# Patient Record
Sex: FEMALE | Race: WHITE | Employment: FULL TIME | URBAN - METROPOLITAN AREA
[De-identification: names, ages, dates, MRNs, and addresses within clinical notes are randomized per-mention and may not be internally consistent; named-entity substitution may affect disease eponyms.]

---

## 2017-03-31 ENCOUNTER — OFFICE VISIT (OUTPATIENT)
Dept: FAMILY MEDICINE CLINIC | Age: 52
End: 2017-03-31

## 2017-03-31 VITALS
BODY MASS INDEX: 33.49 KG/M2 | HEART RATE: 68 BPM | RESPIRATION RATE: 14 BRPM | WEIGHT: 189 LBS | DIASTOLIC BLOOD PRESSURE: 64 MMHG | HEIGHT: 63 IN | SYSTOLIC BLOOD PRESSURE: 112 MMHG

## 2017-03-31 DIAGNOSIS — Z12.11 SCREEN FOR COLON CANCER: ICD-10-CM

## 2017-03-31 DIAGNOSIS — Z00.00 WELL ADULT EXAM: Primary | ICD-10-CM

## 2017-03-31 DIAGNOSIS — L30.9 ECZEMA, UNSPECIFIED TYPE: ICD-10-CM

## 2017-03-31 PROCEDURE — 99386 PREV VISIT NEW AGE 40-64: CPT | Performed by: FAMILY MEDICINE

## 2017-03-31 RX ORDER — OLOPATADINE HCL 0.2 %
1 DROPS OPHTHALMIC (EYE) DAILY
COMMUNITY
Start: 2017-03-27 | End: 2017-04-25 | Stop reason: ALTCHOICE

## 2017-03-31 RX ORDER — PREDNISONE 20 MG/1
TABLET ORAL
COMMUNITY
Start: 2017-03-27 | End: 2017-04-25 | Stop reason: ALTCHOICE

## 2017-03-31 ASSESSMENT — ENCOUNTER SYMPTOMS
CONSTIPATION: 0
SHORTNESS OF BREATH: 0
SINUS PRESSURE: 0

## 2017-04-25 ENCOUNTER — OFFICE VISIT (OUTPATIENT)
Dept: FAMILY MEDICINE CLINIC | Age: 52
End: 2017-04-25

## 2017-04-25 VITALS
SYSTOLIC BLOOD PRESSURE: 118 MMHG | HEART RATE: 64 BPM | HEIGHT: 63 IN | BODY MASS INDEX: 32.8 KG/M2 | WEIGHT: 185.13 LBS | DIASTOLIC BLOOD PRESSURE: 70 MMHG | RESPIRATION RATE: 16 BRPM

## 2017-04-25 DIAGNOSIS — K52.9 ENTERITIS: Primary | ICD-10-CM

## 2017-04-25 PROCEDURE — 99213 OFFICE O/P EST LOW 20 MIN: CPT | Performed by: FAMILY MEDICINE

## 2017-04-25 ASSESSMENT — ENCOUNTER SYMPTOMS
ABDOMINAL PAIN: 1
SINUS PRESSURE: 0
DIARRHEA: 1
NAUSEA: 0
SHORTNESS OF BREATH: 0
CONSTIPATION: 0

## 2017-07-25 ENCOUNTER — OFFICE VISIT (OUTPATIENT)
Dept: INTERNAL MEDICINE CLINIC | Age: 52
End: 2017-07-25
Payer: MEDICARE

## 2017-07-25 VITALS
DIASTOLIC BLOOD PRESSURE: 84 MMHG | HEIGHT: 64 IN | HEART RATE: 72 BPM | WEIGHT: 194 LBS | SYSTOLIC BLOOD PRESSURE: 130 MMHG | BODY MASS INDEX: 33.12 KG/M2

## 2017-07-25 DIAGNOSIS — R12 HEARTBURN: ICD-10-CM

## 2017-07-25 DIAGNOSIS — R07.9 CHEST PAIN, UNSPECIFIED TYPE: Primary | ICD-10-CM

## 2017-07-25 PROCEDURE — 93000 ELECTROCARDIOGRAM COMPLETE: CPT | Performed by: INTERNAL MEDICINE

## 2017-07-25 PROCEDURE — 99214 OFFICE O/P EST MOD 30 MIN: CPT | Performed by: INTERNAL MEDICINE

## 2017-07-25 RX ORDER — IBUPROFEN 200 MG
800 TABLET ORAL
COMMUNITY
End: 2019-06-07

## 2017-07-25 RX ORDER — OMEPRAZOLE 20 MG/1
20 CAPSULE, DELAYED RELEASE ORAL DAILY
Qty: 30 CAPSULE | Refills: 3 | Status: SHIPPED | OUTPATIENT
Start: 2017-07-25 | End: 2017-07-25 | Stop reason: CLARIF

## 2017-07-25 RX ORDER — CALCIUM CARBONATE 200(500)MG
2 TABLET,CHEWABLE ORAL 3 TIMES DAILY
COMMUNITY
End: 2019-06-07

## 2017-07-25 RX ORDER — OMEPRAZOLE 20 MG/1
20 CAPSULE, DELAYED RELEASE ORAL DAILY
Qty: 30 CAPSULE | Refills: 3 | Status: SHIPPED | OUTPATIENT
Start: 2017-07-25 | End: 2019-06-07

## 2017-07-27 ENCOUNTER — OFFICE VISIT (OUTPATIENT)
Dept: FAMILY MEDICINE CLINIC | Age: 52
End: 2017-07-27

## 2017-07-27 ENCOUNTER — NURSE TRIAGE (OUTPATIENT)
Dept: ADMINISTRATIVE | Age: 52
End: 2017-07-27

## 2017-07-27 ENCOUNTER — HOSPITAL ENCOUNTER (OUTPATIENT)
Age: 52
Discharge: HOME | End: 2017-07-27
Payer: MEDICARE

## 2017-07-27 VITALS
SYSTOLIC BLOOD PRESSURE: 118 MMHG | HEART RATE: 64 BPM | BODY MASS INDEX: 32.84 KG/M2 | WEIGHT: 192.38 LBS | DIASTOLIC BLOOD PRESSURE: 56 MMHG | RESPIRATION RATE: 16 BRPM | HEIGHT: 64 IN

## 2017-07-27 DIAGNOSIS — R10.10 PAIN OF UPPER ABDOMEN: ICD-10-CM

## 2017-07-27 DIAGNOSIS — R10.10 PAIN OF UPPER ABDOMEN: Primary | ICD-10-CM

## 2017-07-27 LAB
ALBUMIN SERPL-MCNC: 4 G/DL (ref 3.5–5.1)
ALP BLD-CCNC: 73 U/L (ref 38–126)
ALT SERPL-CCNC: 11 U/L (ref 11–66)
ANION GAP SERPL CALCULATED.3IONS-SCNC: 14 MEQ/L (ref 8–16)
AST SERPL-CCNC: 9 U/L (ref 5–40)
BASOPHILS # BLD: 1 %
BASOPHILS ABSOLUTE: 0.1 THOU/MM3 (ref 0–0.1)
BILIRUB SERPL-MCNC: 0.3 MG/DL (ref 0.3–1.2)
BUN BLDV-MCNC: 16 MG/DL (ref 7–22)
CALCIUM SERPL-MCNC: 9.3 MG/DL (ref 8.5–10.5)
CHLORIDE BLD-SCNC: 103 MEQ/L (ref 98–111)
CO2: 25 MEQ/L (ref 23–33)
CREAT SERPL-MCNC: 0.6 MG/DL (ref 0.4–1.2)
EOSINOPHIL # BLD: 3.3 %
EOSINOPHILS ABSOLUTE: 0.2 THOU/MM3 (ref 0–0.4)
GFR SERPL CREATININE-BSD FRML MDRD: > 90 ML/MIN/1.73M2
GLUCOSE BLD-MCNC: 95 MG/DL (ref 70–108)
HCT VFR BLD CALC: 44.8 % (ref 37–47)
HEMOGLOBIN: 15.3 GM/DL (ref 12–16)
LIPASE: 20.6 U/L (ref 5.6–51.3)
LYMPHOCYTES # BLD: 46.5 %
LYMPHOCYTES ABSOLUTE: 2.9 THOU/MM3 (ref 1–4.8)
MCH RBC QN AUTO: 32 PG (ref 27–31)
MCHC RBC AUTO-ENTMCNC: 34.2 GM/DL (ref 33–37)
MCV RBC AUTO: 93.8 FL (ref 81–99)
MONOCYTES # BLD: 7.2 %
MONOCYTES ABSOLUTE: 0.4 THOU/MM3 (ref 0.4–1.3)
NUCLEATED RED BLOOD CELLS: 0 /100 WBC
PDW BLD-RTO: 12.6 % (ref 11.5–14.5)
PLATELET # BLD: 279 THOU/MM3 (ref 130–400)
PMV BLD AUTO: 8 MCM (ref 7.4–10.4)
POTASSIUM SERPL-SCNC: 4.2 MEQ/L (ref 3.5–5.2)
RBC # BLD: 4.78 MILL/MM3 (ref 4.2–5.4)
RBC # BLD: NORMAL 10*6/UL
SEG NEUTROPHILS: 42 %
SEGMENTED NEUTROPHILS ABSOLUTE COUNT: 2.6 THOU/MM3 (ref 1.8–7.7)
SODIUM BLD-SCNC: 142 MEQ/L (ref 135–145)
TOTAL PROTEIN: 7.1 G/DL (ref 6.1–8)
WBC # BLD: 6.2 THOU/MM3 (ref 4.8–10.8)

## 2017-07-27 PROCEDURE — 99214 OFFICE O/P EST MOD 30 MIN: CPT | Performed by: FAMILY MEDICINE

## 2017-07-27 PROCEDURE — 80053 COMPREHEN METABOLIC PANEL: CPT

## 2017-07-27 PROCEDURE — 85025 COMPLETE CBC W/AUTO DIFF WBC: CPT

## 2017-07-27 PROCEDURE — 36415 COLL VENOUS BLD VENIPUNCTURE: CPT

## 2017-07-27 PROCEDURE — 83690 ASSAY OF LIPASE: CPT

## 2017-07-27 ASSESSMENT — ENCOUNTER SYMPTOMS
NAUSEA: 1
ABDOMINAL PAIN: 1
SHORTNESS OF BREATH: 1
CONSTIPATION: 1
SINUS PRESSURE: 0

## 2017-07-28 ENCOUNTER — HOSPITAL ENCOUNTER (OUTPATIENT)
Dept: ULTRASOUND IMAGING | Age: 52
Discharge: HOME | End: 2017-07-28
Payer: MEDICARE

## 2017-07-28 ENCOUNTER — TELEPHONE (OUTPATIENT)
Dept: FAMILY MEDICINE CLINIC | Age: 52
End: 2017-07-28

## 2017-07-28 DIAGNOSIS — R10.33 PERIUMBILICAL ABDOMINAL PAIN: Primary | ICD-10-CM

## 2017-07-28 DIAGNOSIS — R10.10 PAIN OF UPPER ABDOMEN: ICD-10-CM

## 2017-07-28 PROCEDURE — 76705 ECHO EXAM OF ABDOMEN: CPT

## 2017-07-28 RX ORDER — DICYCLOMINE HYDROCHLORIDE 10 MG/1
10 CAPSULE ORAL 4 TIMES DAILY
Qty: 30 CAPSULE | Refills: 1 | Status: SHIPPED | OUTPATIENT
Start: 2017-07-28 | End: 2019-06-07

## 2017-07-31 ENCOUNTER — HOSPITAL ENCOUNTER (OUTPATIENT)
Dept: NUCLEAR MEDICINE | Age: 52
Discharge: HOME | End: 2017-07-31
Payer: MEDICARE

## 2017-07-31 ENCOUNTER — TELEPHONE (OUTPATIENT)
Dept: FAMILY MEDICINE CLINIC | Age: 52
End: 2017-07-31

## 2017-07-31 VITALS — WEIGHT: 192 LBS | BODY MASS INDEX: 32.96 KG/M2

## 2017-07-31 DIAGNOSIS — R10.10 PAIN OF UPPER ABDOMEN: ICD-10-CM

## 2017-07-31 PROCEDURE — 3430000000 HC RX DIAGNOSTIC RADIOPHARMACEUTICAL: Performed by: FAMILY MEDICINE

## 2017-07-31 PROCEDURE — A6257 TRANSPARENT FILM <= 16 SQ IN: HCPCS

## 2017-07-31 PROCEDURE — 6360000004 HC RX CONTRAST MEDICATION: Performed by: RADIOLOGY

## 2017-07-31 PROCEDURE — A9537 TC99M MEBROFENIN: HCPCS | Performed by: FAMILY MEDICINE

## 2017-07-31 PROCEDURE — 78227 HEPATOBIL SYST IMAGE W/DRUG: CPT

## 2017-07-31 RX ADMIN — SINCALIDE 1.73 MCG: 5 INJECTION, POWDER, LYOPHILIZED, FOR SOLUTION INTRAVENOUS at 08:40

## 2017-07-31 RX ADMIN — Medication 7.9 MILLICURIE: at 07:30

## 2017-08-02 ENCOUNTER — TELEPHONE (OUTPATIENT)
Dept: FAMILY MEDICINE CLINIC | Age: 52
End: 2017-08-02

## 2017-08-04 ENCOUNTER — HOSPITAL ENCOUNTER (OUTPATIENT)
Dept: CT IMAGING | Age: 52
Discharge: HOME | End: 2017-08-04
Payer: MEDICARE

## 2017-08-04 ENCOUNTER — TELEPHONE (OUTPATIENT)
Dept: FAMILY MEDICINE CLINIC | Age: 52
End: 2017-08-04

## 2017-08-04 DIAGNOSIS — R10.33 PERIUMBILICAL ABDOMINAL PAIN: ICD-10-CM

## 2017-08-04 PROCEDURE — 6360000004 HC RX CONTRAST MEDICATION: Performed by: FAMILY MEDICINE

## 2017-08-04 PROCEDURE — 74177 CT ABD & PELVIS W/CONTRAST: CPT

## 2017-08-04 RX ADMIN — IOPAMIDOL 85 ML: 755 INJECTION, SOLUTION INTRAVENOUS at 07:44

## 2017-08-28 ENCOUNTER — HOSPITAL ENCOUNTER (OUTPATIENT)
Dept: WOMENS IMAGING | Age: 52
Discharge: HOME | End: 2017-08-28
Payer: COMMERCIAL

## 2017-08-28 DIAGNOSIS — Z12.31 VISIT FOR SCREENING MAMMOGRAM: ICD-10-CM

## 2017-08-28 PROCEDURE — 77063 BREAST TOMOSYNTHESIS BI: CPT

## 2017-10-02 PROBLEM — D12.6 ADENOMATOUS COLON POLYP: Status: ACTIVE | Noted: 2017-09-01

## 2019-03-21 ENCOUNTER — HOSPITAL ENCOUNTER (OUTPATIENT)
Dept: WOMENS IMAGING | Age: 54
Discharge: HOME | End: 2019-03-21
Payer: COMMERCIAL

## 2019-03-21 DIAGNOSIS — Z12.31 VISIT FOR SCREENING MAMMOGRAM: ICD-10-CM

## 2019-03-21 PROCEDURE — 77063 BREAST TOMOSYNTHESIS BI: CPT

## 2019-06-07 ENCOUNTER — OFFICE VISIT (OUTPATIENT)
Dept: FAMILY MEDICINE CLINIC | Age: 54
End: 2019-06-07

## 2019-06-07 VITALS
RESPIRATION RATE: 14 BRPM | BODY MASS INDEX: 29.79 KG/M2 | HEIGHT: 64 IN | WEIGHT: 174.5 LBS | HEART RATE: 68 BPM | SYSTOLIC BLOOD PRESSURE: 106 MMHG | DIASTOLIC BLOOD PRESSURE: 64 MMHG

## 2019-06-07 DIAGNOSIS — R07.89 ATYPICAL CHEST PAIN: ICD-10-CM

## 2019-06-07 DIAGNOSIS — Z00.00 WELL ADULT EXAM: Primary | ICD-10-CM

## 2019-06-07 PROCEDURE — 99396 PREV VISIT EST AGE 40-64: CPT | Performed by: FAMILY MEDICINE

## 2019-06-07 PROCEDURE — 93000 ELECTROCARDIOGRAM COMPLETE: CPT | Performed by: FAMILY MEDICINE

## 2019-06-07 RX ORDER — ASPIRIN 81 MG/1
81 TABLET ORAL DAILY
COMMUNITY
Start: 2019-06-07 | End: 2019-07-03 | Stop reason: ALTCHOICE

## 2019-06-07 RX ORDER — LACTOBACILLUS RHAMNOSUS GG 10B CELL
1 CAPSULE ORAL DAILY
COMMUNITY
End: 2022-08-30 | Stop reason: ALTCHOICE

## 2019-06-07 ASSESSMENT — PATIENT HEALTH QUESTIONNAIRE - PHQ9
SUM OF ALL RESPONSES TO PHQ QUESTIONS 1-9: 0
SUM OF ALL RESPONSES TO PHQ QUESTIONS 1-9: 0
2. FEELING DOWN, DEPRESSED OR HOPELESS: 0
1. LITTLE INTEREST OR PLEASURE IN DOING THINGS: 0
SUM OF ALL RESPONSES TO PHQ9 QUESTIONS 1 & 2: 0

## 2019-06-07 ASSESSMENT — ENCOUNTER SYMPTOMS
SINUS PRESSURE: 0
CONSTIPATION: 0
SHORTNESS OF BREATH: 0

## 2019-06-07 NOTE — PATIENT INSTRUCTIONS
Call Dr Josiah Reddy office to arrange a cardiac evaluation  Begin a baby aspirin one daily  Do the fasting labs soon  See me in a year

## 2019-06-07 NOTE — PROGRESS NOTES
Subjective:      Patient ID: Dev Chavez is a 49 y.o. female. HPI  Well Adult Physical   Patient here for a comprehensive physical exam.The patient reports problems - there has been a pinching feeling in the chest the past year. It has gotten worse with the feeling more intense. It could last 4-5 minutes. There are no other associated cardiac symptoms at that time. It could come and go over the day. Non smoker. She has seen Dr Jaswinder Brown in the past for a cardiac eval. Mom with MI age 61 (smoker)  Do you take any herbs or supplements that were not prescribed by a doctor? yes Are you taking calcium supplements? no Are you taking aspirin daily? no   History:  She sees a GYN    Review of Systems   Constitutional: Negative for fatigue. HENT: Negative for sinus pressure. Eyes: Negative for visual disturbance. Respiratory: Negative for shortness of breath. Cardiovascular: Negative for chest pain. Gastrointestinal: Negative for constipation. Genitourinary: Negative. Musculoskeletal: Negative for arthralgias. Skin: Negative for rash. Neurological: Negative for headaches. The patient's medications, allergies, past medical problems, surgical, social, and family histories were reviewed and updated as needed. Objective:   Physical Exam   Constitutional: She is oriented to person, place, and time. She appears well-developed and well-nourished. No distress. HENT:   Head: Normocephalic and atraumatic. Right Ear: External ear normal.   Left Ear: External ear normal.   Nose: Nose normal.   Mouth/Throat: Oropharynx is clear and moist. No oropharyngeal exudate. Eyes: Conjunctivae are normal. No scleral icterus. Neck: Neck supple. Carotid bruit is not present. No tracheal deviation present. No thyromegaly present. Cardiovascular: Normal rate, regular rhythm, normal heart sounds and intact distal pulses. Pulmonary/Chest: Effort normal and breath sounds normal.   Abdominal: Soft.  Bowel sounds are normal. She exhibits no mass. Musculoskeletal: She exhibits no edema. Lymphadenopathy:     She has no cervical adenopathy. Neurological: She is alert and oriented to person, place, and time. Skin: Skin is warm and dry. Psychiatric: She has a normal mood and affect. Her behavior is normal.   Blood pressure 106/64, pulse 68, resp. rate 14, height 5' 4\" (1.626 m), weight 174 lb 8 oz (79.2 kg), not currently breastfeeding. The EKG is reviewed by me with comparison to 7/25/2017 and shows NSR and no ischemic change, the incomplete RBBB continuesw    Assessment:       Diagnosis Orders   1. Well adult exam  Glucose    Lipid, Fasting   2.  Atypical chest pain  EKG 12 Lead           Plan:      Call Dr Nirmal Ewing office to arrange a cardiac evaluation  Begin a baby aspirin one daily  Do the fasting labs soon  See me in a year        Toni Horne MD

## 2019-06-18 ENCOUNTER — HOSPITAL ENCOUNTER (OUTPATIENT)
Age: 54
Discharge: HOME | End: 2019-06-18
Payer: COMMERCIAL

## 2019-06-18 ENCOUNTER — OFFICE VISIT (OUTPATIENT)
Dept: INTERNAL MEDICINE CLINIC | Age: 54
End: 2019-06-18
Payer: COMMERCIAL

## 2019-06-18 VITALS
HEART RATE: 72 BPM | BODY MASS INDEX: 29.71 KG/M2 | WEIGHT: 174 LBS | SYSTOLIC BLOOD PRESSURE: 130 MMHG | DIASTOLIC BLOOD PRESSURE: 72 MMHG | HEIGHT: 64 IN

## 2019-06-18 DIAGNOSIS — R07.89 OTHER CHEST PAIN: Primary | ICD-10-CM

## 2019-06-18 DIAGNOSIS — Z00.00 WELL ADULT EXAM: ICD-10-CM

## 2019-06-18 LAB
CHOLESTEROL, FASTING: 227 MG/DL (ref 100–199)
GLUCOSE BLD-MCNC: 91 MG/DL (ref 70–108)
HDLC SERPL-MCNC: 85 MG/DL
LDL CHOLESTEROL CALCULATED: 123 MG/DL
TRIGLYCERIDE, FASTING: 95 MG/DL (ref 0–199)

## 2019-06-18 PROCEDURE — 99214 OFFICE O/P EST MOD 30 MIN: CPT | Performed by: INTERNAL MEDICINE

## 2019-06-18 PROCEDURE — 80061 LIPID PANEL: CPT

## 2019-06-18 PROCEDURE — 82947 ASSAY GLUCOSE BLOOD QUANT: CPT

## 2019-06-18 PROCEDURE — 36415 COLL VENOUS BLD VENIPUNCTURE: CPT

## 2019-06-18 RX ORDER — MV-MIN/FOLIC/VIT K/LUT/HERB293 240-120MCG
TABLET ORAL DAILY
COMMUNITY
End: 2022-10-03

## 2019-06-18 NOTE — PROGRESS NOTES
organomegaly  Neurological - alert, oriented, normal speech, no focal findings or movement disorder noted  Musculoskeletal - no joint tenderness, deformity or swelling  Extremities - peripheral pulses normal, no pedal edema, no clubbing or cyanosis  Skin - normal coloration and turgor, no rashes, no suspicious skin lesions noted                   Diagnosis Orders   1.  Other chest pain  NM MYOCARDIAL SPECT REST EXERCISE OR RX     Chest pain atypical but needs further work up  Will order exercise cardiolite stress test  EKG ordered by Dr. Jacy Tim shows sinus rhythm, low voltage  I discussed the situation at length with patient and   answered all their questions  I will follow-up on her stress test

## 2019-06-19 ENCOUNTER — TELEPHONE (OUTPATIENT)
Dept: FAMILY MEDICINE CLINIC | Age: 54
End: 2019-06-19

## 2019-06-19 NOTE — TELEPHONE ENCOUNTER
----- Message from Gino Chavez MD sent at 6/18/2019 11:16 PM EDT -----  Let her know the glucose was fine but the cholesterol could be better. Keep working at the diet and weight loss and I would check the lipids again in a year.

## 2019-06-20 ENCOUNTER — TELEPHONE (OUTPATIENT)
Dept: FAMILY MEDICINE CLINIC | Age: 54
End: 2019-06-20

## 2019-06-20 RX ORDER — SULFACETAMIDE SODIUM 100 MG/ML
2 SOLUTION/ DROPS OPHTHALMIC
Qty: 1 BOTTLE | Refills: 0 | Status: SHIPPED | OUTPATIENT
Start: 2019-06-20 | End: 2019-06-30

## 2019-06-20 NOTE — TELEPHONE ENCOUNTER
Pt called stating that her left eye has a bump with yellow pus on the inside of lower eye lid. Her whole eye is red. She stated that it just feels like something is in. Not painful just irritated. She is asking if something would be able to be called in.      [facility] in [place] before [address] in [place] after [address] may be reached at [phone]

## 2019-07-02 ENCOUNTER — TELEPHONE (OUTPATIENT)
Dept: FAMILY MEDICINE CLINIC | Age: 54
End: 2019-07-02

## 2019-07-03 ENCOUNTER — TELEPHONE (OUTPATIENT)
Dept: FAMILY MEDICINE CLINIC | Age: 54
End: 2019-07-03

## 2019-07-11 ENCOUNTER — TELEPHONE (OUTPATIENT)
Dept: INTERNAL MEDICINE CLINIC | Age: 54
End: 2019-07-11

## 2019-08-09 ENCOUNTER — TELEPHONE (OUTPATIENT)
Dept: FAMILY MEDICINE CLINIC | Age: 54
End: 2019-08-09

## 2019-08-09 RX ORDER — DICYCLOMINE HYDROCHLORIDE 10 MG/1
10 CAPSULE ORAL 4 TIMES DAILY
Qty: 30 CAPSULE | Refills: 1 | Status: SHIPPED | OUTPATIENT
Start: 2019-08-09 | End: 2022-08-30 | Stop reason: ALTCHOICE

## 2019-08-09 NOTE — TELEPHONE ENCOUNTER
Pt states that her IBS is acting up and would like for you to prescribe something. States you had given her something before and would like that again.        [facility] on [address]

## 2020-07-21 ENCOUNTER — TELEPHONE (OUTPATIENT)
Dept: FAMILY MEDICINE CLINIC | Age: 55
End: 2020-07-21

## 2020-07-21 NOTE — TELEPHONE ENCOUNTER
Patient called stating that she has been exposed to a co worker on [address] [place] [address] last week. She does not have any symptoms but her employer [employer] in [place] is advising her to get tested.   Please call her back [phone]

## 2020-07-21 NOTE — TELEPHONE ENCOUNTER
I can order the swab for her to do at one of the [address] [place] [address] [facility] but in the mean time she should quarantine herself till the results are back. Will she need a note?

## 2020-09-17 ENCOUNTER — HOSPITAL ENCOUNTER (OUTPATIENT)
Dept: WOMENS IMAGING | Age: 55
Discharge: HOME | End: 2020-09-17
Payer: COMMERCIAL

## 2020-09-17 PROCEDURE — 77063 BREAST TOMOSYNTHESIS BI: CPT

## 2020-12-10 ENCOUNTER — VIRTUAL VISIT (OUTPATIENT)
Dept: FAMILY MEDICINE CLINIC | Age: 55
End: 2020-12-10

## 2020-12-10 ENCOUNTER — HOSPITAL ENCOUNTER (OUTPATIENT)
Age: 55
Discharge: HOME | End: 2020-12-10
Payer: COMMERCIAL

## 2020-12-10 DIAGNOSIS — Z20.822 SUSPECTED COVID-19 VIRUS INFECTION: ICD-10-CM

## 2020-12-10 PROCEDURE — 99213 OFFICE O/P EST LOW 20 MIN: CPT | Performed by: FAMILY MEDICINE

## 2020-12-10 PROCEDURE — U0003 INFECTIOUS AGENT DETECTION BY NUCLEIC ACID (DNA OR RNA); SEVERE ACUTE RESPIRATORY SYNDROME CORONAVIRUS 2 (SARS-COV-2) (CORONAVIRUS DISEASE [COVID-19]), AMPLIFIED PROBE TECHNIQUE, MAKING USE OF HIGH THROUGHPUT TECHNOLOGIES AS DESCRIBED BY CMS-2020-01-R: HCPCS

## 2020-12-10 PROCEDURE — 99211 OFF/OP EST MAY X REQ PHY/QHP: CPT

## 2020-12-10 ASSESSMENT — ENCOUNTER SYMPTOMS
DIARRHEA: 0
BACK PAIN: 1
SHORTNESS OF BREATH: 0
COUGH: 1
SORE THROAT: 1
SINUS PRESSURE: 0
NAUSEA: 0

## 2020-12-10 ASSESSMENT — PATIENT HEALTH QUESTIONNAIRE - PHQ9
SUM OF ALL RESPONSES TO PHQ QUESTIONS 1-9: 0
SUM OF ALL RESPONSES TO PHQ9 QUESTIONS 1 & 2: 0
SUM OF ALL RESPONSES TO PHQ QUESTIONS 1-9: 0
2. FEELING DOWN, DEPRESSED OR HOPELESS: 0
SUM OF ALL RESPONSES TO PHQ QUESTIONS 1-9: 0
1. LITTLE INTEREST OR PLEASURE IN DOING THINGS: 0

## 2020-12-10 NOTE — PROGRESS NOTES
rate-    Temperature-  Pulse oximetry-     Constitutional: [x] Appears well-developed and well-nourished [] No apparent distress      [] Abnormal-   Mental status  [x] Alert and awake  [] Oriented to person/place/time []Able to follow commands      Eyes:  EOM    []  Normal  [] Abnormal-  Sclera  [x]  Normal  [] Abnormal -         Discharge []  None visible  [] Abnormal -    HENT:   [x] Normocephalic, atraumatic. [] Abnormal   [] Mouth/Throat: Mucous membranes are moist.     External Ears [x] Normal  [] Abnormal-     Neck: [x] No visualized mass     Pulmonary/Chest: [x] Respiratory effort normal.  [] No visualized signs of difficulty breathing or respiratory distress        [] Abnormal-      Musculoskeletal:   [] Normal gait with no signs of ataxia         [x] Normal range of motion of neck        [] Abnormal-       Neurological:        [x] No Facial Asymmetry (Cranial nerve 7 motor function) (limited exam to video visit)          [] No gaze palsy        [] Abnormal-         Skin:        [x] No significant exanthematous lesions or discoloration noted on facial skin         [] Abnormal-            Psychiatric:       [x] Normal Affect [] No Hallucinations        [] Abnormal-     Other pertinent observable physical exam findings-     ASSESSMENT/PLAN:   Diagnosis Orders   1. Suspected COVID-19 virus infection  COVID-19 Ambulatory       Laura Chavez is a 48 y.o. female being evaluated by a Virtual Visit (video visit) encounter to address concerns as mentioned above. A caregiver was present when appropriate. Due to this being a TeleHealth encounter (During [facility] public health emergency), evaluation of the following organ systems was limited: Vitals/Constitutional/EENT/Resp/CV/GI//MS/Neuro/Skin/Heme-Lymph-Imm.   Pursuant to the emergency declaration under the [facility], [address] [place] [address] authority and the [employer] Act, this Virtual Visit was conducted with patient's (and/or legal guardian's) consent, to reduce the patient's risk of exposure to COVID-19 and provide necessary medical care. The patient (and/or legal guardian) has also been advised to contact this office for worsening conditions or problems, and seek emergency medical treatment and/or call 911 if deemed necessary. Patient identification was verified at the start of the visit: Yes    Total time spent on this encounter: 15 minutes    Services were provided through a video synchronous discussion virtually to substitute for in-person clinic visit. --Ignacia Villanueva MD on 12/10/2020 at 2:30 PM    An electronic signature was used to authenticate this note.

## 2020-12-10 NOTE — PROGRESS NOTES
Brionna Herman agreed to Video Chat in presence of Dr Anant Cornejo and myself. Verified who was present in room with Brionna Herman. Patient informed the e-mail address used to Face Time cannot be used to contact the Provider, if they are any questions or concerns they need to call the office directly. Brionna Herman stated understanding.

## 2020-12-12 LAB — SARS-COV-2: DETECTED

## 2020-12-21 ENCOUNTER — TELEPHONE (OUTPATIENT)
Dept: FAMILY MEDICINE CLINIC | Age: 55
End: 2020-12-21

## 2020-12-21 RX ORDER — GUAIFENESIN AND CODEINE PHOSPHATE 100; 10 MG/5ML; MG/5ML
10 SOLUTION ORAL 4 TIMES DAILY PRN
Qty: 180 ML | Refills: 0 | Status: SHIPPED | OUTPATIENT
Start: 2020-12-21 | End: 2020-12-28

## 2020-12-21 RX ORDER — AZITHROMYCIN 250 MG/1
TABLET, FILM COATED ORAL
Qty: 1 PACKET | Refills: 0 | Status: SHIPPED | OUTPATIENT
Start: 2020-12-21 | End: 2020-12-31

## 2020-12-21 NOTE — TELEPHONE ENCOUNTER
Pt called stating that today is day 14 of her having covid. She stated that she is still dry coughing, chest burning, light headness, and fatigue. She is asking if there is anything else that she would be able to do to help her feel better to go back to work. Pt stated that prednisone was called in for her on Saturday and she did start it that day.      [facility] in [place] may be reached at [phone]

## 2020-12-21 NOTE — TELEPHONE ENCOUNTER
Patient called and stated the cough medicine called in over the weekend (Sent to [facility] on [address]) the pharmacy can not get it in- it was supposed to be in today however it did not. She would like it resent to [facility] on [address] since it can not be transferred due to Codeine in it.

## 2020-12-21 NOTE — TELEPHONE ENCOUNTER
Patient is on vitamin c, zinc, vitamin d. Pharmacy didn't have cough syrup. It should be at pharmacy today.

## 2022-01-13 ENCOUNTER — HOSPITAL ENCOUNTER (OUTPATIENT)
Dept: WOMENS IMAGING | Age: 57
Discharge: HOME | End: 2022-01-13
Payer: COMMERCIAL

## 2022-01-13 DIAGNOSIS — Z12.31 VISIT FOR SCREENING MAMMOGRAM: ICD-10-CM

## 2022-01-13 PROCEDURE — 77063 BREAST TOMOSYNTHESIS BI: CPT

## 2022-02-24 ENCOUNTER — HOSPITAL ENCOUNTER (EMERGENCY)
Age: 57
Discharge: HOME | End: 2022-02-24
Attending: EMERGENCY MEDICINE
Payer: COMMERCIAL

## 2022-02-24 ENCOUNTER — APPOINTMENT (OUTPATIENT)
Dept: GENERAL RADIOLOGY | Age: 57
End: 2022-02-24
Payer: COMMERCIAL

## 2022-02-24 VITALS
TEMPERATURE: 97.9 F | HEIGHT: 64 IN | SYSTOLIC BLOOD PRESSURE: 108 MMHG | HEART RATE: 68 BPM | DIASTOLIC BLOOD PRESSURE: 59 MMHG | OXYGEN SATURATION: 99 % | RESPIRATION RATE: 16 BRPM | BODY MASS INDEX: 33.29 KG/M2 | WEIGHT: 195 LBS

## 2022-02-24 DIAGNOSIS — R07.9 CHEST PAIN, UNSPECIFIED TYPE: Primary | ICD-10-CM

## 2022-02-24 DIAGNOSIS — S39.012A BACK STRAIN, INITIAL ENCOUNTER: ICD-10-CM

## 2022-02-24 LAB
ANION GAP SERPL CALCULATED.3IONS-SCNC: 10 MEQ/L (ref 8–16)
BASOPHILS # BLD: 0.7 %
BASOPHILS ABSOLUTE: 0.1 THOU/MM3 (ref 0–0.1)
BUN BLDV-MCNC: 16 MG/DL (ref 7–22)
CALCIUM SERPL-MCNC: 9.4 MG/DL (ref 8.5–10.5)
CHLORIDE BLD-SCNC: 106 MEQ/L (ref 98–111)
CO2: 23 MEQ/L (ref 23–33)
CREAT SERPL-MCNC: 0.6 MG/DL (ref 0.4–1.2)
D-DIMER QUANTITATIVE: < 215 NG/ML FEU (ref 0–500)
EOSINOPHIL # BLD: 3.3 %
EOSINOPHILS ABSOLUTE: 0.3 THOU/MM3 (ref 0–0.4)
ERYTHROCYTE [DISTWIDTH] IN BLOOD BY AUTOMATED COUNT: 11.9 % (ref 11.5–14.5)
ERYTHROCYTE [DISTWIDTH] IN BLOOD BY AUTOMATED COUNT: 41.4 FL (ref 35–45)
GFR SERPL CREATININE-BSD FRML MDRD: > 90 ML/MIN/1.73M2
GLUCOSE BLD-MCNC: 98 MG/DL (ref 70–108)
HCT VFR BLD CALC: 42.1 % (ref 37–47)
HEMOGLOBIN: 14.2 GM/DL (ref 12–16)
IMMATURE GRANS (ABS): 0.02 THOU/MM3 (ref 0–0.07)
IMMATURE GRANULOCYTES: 0.2 %
LYMPHOCYTES # BLD: 37.9 %
LYMPHOCYTES ABSOLUTE: 3.2 THOU/MM3 (ref 1–4.8)
MCH RBC QN AUTO: 32.3 PG (ref 26–33)
MCHC RBC AUTO-ENTMCNC: 33.7 GM/DL (ref 32.2–35.5)
MCV RBC AUTO: 95.7 FL (ref 81–99)
MONOCYTES # BLD: 8 %
MONOCYTES ABSOLUTE: 0.7 THOU/MM3 (ref 0.4–1.3)
NUCLEATED RED BLOOD CELLS: 0 /100 WBC
OSMOLALITY CALCULATION: 278.7 MOSMOL/KG (ref 275–300)
PLATELET # BLD: 270 THOU/MM3 (ref 130–400)
PMV BLD AUTO: 9 FL (ref 9.4–12.4)
POTASSIUM REFLEX MAGNESIUM: 4 MEQ/L (ref 3.5–5.2)
RBC # BLD: 4.4 MILL/MM3 (ref 4.2–5.4)
SEG NEUTROPHILS: 49.9 %
SEGMENTED NEUTROPHILS ABSOLUTE COUNT: 4.2 THOU/MM3 (ref 1.8–7.7)
SODIUM BLD-SCNC: 139 MEQ/L (ref 135–145)
TROPONIN T: < 0.01 NG/ML
TROPONIN T: < 0.01 NG/ML
WBC # BLD: 8.4 THOU/MM3 (ref 4.8–10.8)

## 2022-02-24 PROCEDURE — 85379 FIBRIN DEGRADATION QUANT: CPT

## 2022-02-24 PROCEDURE — 85025 COMPLETE CBC W/AUTO DIFF WBC: CPT

## 2022-02-24 PROCEDURE — 99215 OFFICE O/P EST HI 40 MIN: CPT

## 2022-02-24 PROCEDURE — 71045 X-RAY EXAM CHEST 1 VIEW: CPT

## 2022-02-24 PROCEDURE — 99281 EMR DPT VST MAYX REQ PHY/QHP: CPT

## 2022-02-24 PROCEDURE — 99214 OFFICE O/P EST MOD 30 MIN: CPT | Performed by: NURSE PRACTITIONER

## 2022-02-24 PROCEDURE — 36415 COLL VENOUS BLD VENIPUNCTURE: CPT

## 2022-02-24 PROCEDURE — 93005 ELECTROCARDIOGRAM TRACING: CPT | Performed by: NURSE PRACTITIONER

## 2022-02-24 PROCEDURE — 84484 ASSAY OF TROPONIN QUANT: CPT

## 2022-02-24 PROCEDURE — 80048 BASIC METABOLIC PNL TOTAL CA: CPT

## 2022-02-24 PROCEDURE — 93005 ELECTROCARDIOGRAM TRACING: CPT | Performed by: STUDENT IN AN ORGANIZED HEALTH CARE EDUCATION/TRAINING PROGRAM

## 2022-02-24 ASSESSMENT — ENCOUNTER SYMPTOMS
SORE THROAT: 0
CHEST TIGHTNESS: 1
ABDOMINAL PAIN: 0
DIARRHEA: 0
SHORTNESS OF BREATH: 0
BACK PAIN: 1
CONSTIPATION: 0
VOMITING: 0
EYE PAIN: 0
RHINORRHEA: 0
EYE DISCHARGE: 0
COUGH: 0
TROUBLE SWALLOWING: 0
EYE REDNESS: 0
SINUS PAIN: 0
NAUSEA: 0

## 2022-02-24 ASSESSMENT — HEART SCORE: ECG: 0

## 2022-02-24 NOTE — ED NOTES
Patient to ED from [address] [place] [address] c/c chest pain. Patient currently states chest pain is completely gone, but she continues to have tingling in the right arm. Patient reports pressure behind the right breast. VSS.      Kadni Wilcox RN  02/24/22 7100

## 2022-02-24 NOTE — ED PROVIDER NOTES
[address] [address]          Pt Name: Teddy Roberts  MRN: [id number]  [place] 1965  Date of evaluation: 2/24/2022  Treating Resident Physician: Yaritza Roberts MD  Supervising Physician: Dr. Karyle Pott, [address] [place] [address]       Chief Complaint   Patient presents with    Back Pain     shoulder blade into left arm yesterday for approximately 10 minutes    Chest Pain     today at 1000 intermittently for 30 minutes denies SOB diaphoresis nausea     History obtained from the patient. HISTORY OF PRESENT ILLNESS    HPI  Teddy Roberts is a 55 y.o. female who presents to the emergency department for evaluation of chest pain and back pain. Patient states yesterday around 9 AM while sitting at a desk she began having left upper shoulder blade pain that then progressed to a chest pressure. She had associated right arm tingling. She states that it self resolved later that day. She states then today around 10 AM again while sitting at rest she began having some stabbing back pain with chest pressure. She also mentions again right arm tingling. She denies any shortness of breath. She denies any inciting events or trauma. She denies any episodes similar to this in the past.  She denies any previous MI CVA or blood clots. She denies any recent procedures or surgery. She denies any long distance travel, any hemoptysis, any history of malignancy but she does mention estrogen use. Patient denies any new Headache, Fever, Chills, Cough, Shortness of breath, Abdominal pain, Nausea, Vomiting, Diarrhea, Constipation and Leg swelling. The patient has no other acute complaints at this time. REVIEW OF SYSTEMS   Review of Systems   Constitutional: Negative for chills and fever. HENT: Negative for ear pain and sinus pain. Eyes: Negative for pain. Respiratory: Positive for chest tightness. Negative for cough and shortness of breath. Cardiovascular: Positive for chest pain. Negative for leg swelling. Gastrointestinal: Negative for abdominal pain, constipation, diarrhea, nausea and vomiting. Genitourinary: Negative for dysuria and flank pain. Musculoskeletal: Positive for back pain. Negative for neck pain. Skin: Negative for wound. Neurological: Negative for headaches. Psychiatric/Behavioral: Negative for confusion. PAST MEDICAL AND SURGICAL HISTORY     Past Medical History:   Diagnosis Date    Adenomatous colon polyp 09/2017    Heart burn      Past Surgical History:   Procedure Laterality Date    ENDOMETRIAL ABLATION  2010    HYSTERECTOMY  9/28/2012    robotic    OVARY REMOVAL Bilateral 09/28/2012    TUBAL LIGATION  1990         MEDICATIONS   No current facility-administered medications for this encounter. Current Outpatient Medications:     dicyclomine (BENTYL) 10 MG capsule, Take 1 capsule by mouth 4 times daily (Patient not taking: Reported on 12/10/2020), Disp: 30 capsule, Rfl: 1    Multiple Vitamins-Minerals (ALIVE WOMENS 50+) TABS, Take by mouth daily, Disp: , Rfl:     lactobacillus (CULTURELLE) capsule, Take 1 capsule by mouth daily, Disp: , Rfl:     estradiol (ESTRACE) 1 MG tablet, Take 1 tablet by mouth daily for 90 days. , Disp: 90 tablet, Rfl: 0      SOCIAL HISTORY     Social History     Social History Narrative    Not on file     Social History     Tobacco Use    Smoking status: Never Smoker    Smokeless tobacco: Never Used   Vaping Use    Vaping Use: Never used   Substance Use Topics    Alcohol use: Yes     Comment: OCASSIONALLY    Drug use: No         ALLERGIES     Allergies   Allergen Reactions    Vicodin [Hydrocodone-Acetaminophen] Nausea And Vomiting         FAMILY HISTORY     Family History   Problem Relation Age of Onset    Heart Disease Mother     Stroke Mother     Heart Attack Mother     Parkinsonism Mother     Cervical Cancer Sister          PREVIOUS RECORDS   Previous records reviewed: Patient was seen earlier at urgent care for similar symptoms. PHYSICAL EXAM     ED Triage Vitals [02/24/22 1255]   BP Temp Temp Source Pulse Resp SpO2 Height Weight   (!) 147/95 97.3 °F (36.3 °C) Temporal 77 16 97 % 5' 4\" (1.626 m) 195 lb (88.5 kg)     Initial vital signs and nursing assessment reviewed and vitals are/show: normal.   Pulsoximetry is normal per my interpretation. Additional Vital Signs:  Vitals:    02/24/22 1704   BP: (!) 108/59   Pulse: 68   Resp: 16   Temp:    SpO2: 99%       Physical Exam  Constitutional:       General: She is not in acute distress. Appearance: Normal appearance. She is obese. She is not ill-appearing, toxic-appearing or diaphoretic. HENT:      Head: Normocephalic and atraumatic. Right Ear: External ear normal.      Left Ear: External ear normal.   Eyes:      General: No scleral icterus. Right eye: No discharge. Left eye: No discharge. Extraocular Movements: Extraocular movements intact. Pupils: Pupils are equal, round, and reactive to light. Cardiovascular:      Rate and Rhythm: Normal rate and regular rhythm. Heart sounds: No murmur heard. No friction rub. No gallop. Comments: Bilateral upper extremity pulses equal  Pulmonary:      Effort: Pulmonary effort is normal. No respiratory distress. Breath sounds: Normal breath sounds. No stridor. No wheezing, rhonchi or rales. Chest:      Chest wall: No tenderness. Abdominal:      General: Abdomen is flat. There is no distension. Palpations: Abdomen is soft. Tenderness: There is no abdominal tenderness. There is no guarding or rebound. Musculoskeletal:      Cervical back: Neck supple. Right lower leg: No edema. Left lower leg: No edema. Skin:     General: Skin is warm and dry. Neurological:      Mental Status: She is alert and oriented to person, place, and time. Mental status is at baseline.       Comments: GCS 15  Spontaneously moves all 4 extremities  Follows commands  Alert and oriented  Bilateral facial motor sensation intact in distribution of CN V1 V2 V3  Bilateral upper extremity motor and sensation intact  Bilateral lower extremity motor and sensation intact   Psychiatric:         Mood and Affect: Mood normal.         Behavior: Behavior normal.         Thought Content: Thought content normal.         Judgment: Judgment normal.             MEDICAL DECISION MAKING   Initial Assessment:     64-year-old female presenting to the ED for chest pain    Differential diagnoses include but not limited to:  ACS ACS, arrhythmia, aortic dissection, cardiac tamponade, pneumothorax, PE, esophageal rupture, pneumonia, CHF, COPD, myocarditis, pericarditis, electrolyte abnormality, valvular heart disease, GERD, musculoskeletal    Plan:       EKG  Labs  Imaging: CXR  D-dimer for evaluation of PE versus dissection  Check BP in both UE: no significant change   HEART Score: 2  Repeat troponin  Discharge          Patient is a 55 y.o. female who was seen and evaluated in the emergency department for chest pain. EKG showed no signs of acute ischemia. Vital signs were stable. Lab work was unremarkable. We did get a D-dimer given her use of estrogen and concern for possible dissection. Her D-dimer was within normal limits. Imaging was unremarkable. Her heart score is a 2. We did repeat the troponin given the timing of her symptoms. The repeat troponin was negative. It is likely that her pain is stemming from a noncardiac cause. We gave her instructions on stretching exercises for her back pain. Patient was discharged.                 ED RESULTS   Laboratory results:  Labs Reviewed   CBC WITH AUTO DIFFERENTIAL - Abnormal; Notable for the following components:       Result Value    MPV 9.0 (*)     All other components within normal limits   BASIC METABOLIC PANEL W/ REFLEX TO MG FOR LOW K   TROPONIN   D-DIMER, QUANTITATIVE   ANION GAP   GLOMERULAR FILTRATION RATE, ESTIMATED   OSMOLALITY   TROPONIN       Radiologic studies results:  XR CHEST PORTABLE   Final Result   Low lung volumes. No acute intrathoracic process. **This report has been created using voice recognition software. It may contain minor errors which are inherent in voice recognition technology. **      Final report electronically signed by Dr Prince Briones on 2/24/2022 2:50 PM          ED Medications administered this visit: Medications - No data to display      ED COURSE     ED Course as of 02/24/22 1851   Thu Feb 24, 2022   1454 CXR:IMPRESSION:  Low lung volumes. No acute intrathoracic process. [CR]   1513 BC unremarkableBMP unremarkableTroponin D-dimer within normal limits. [CR]   1513 Repeat troponin to be performed at 1730 [CR]   1832 Repeat troponin WNL [CR]      ED Course User Index  [CR] Leia Ruano MD        Strict return precautions and follow up instructions were discussed with the patient prior to discharge, with which the patient agrees. MEDICATION CHANGES     New Prescriptions    No medications on file         FINAL DISPOSITION     Final diagnoses:   Chest pain, unspecified type   Back strain, initial encounter     Condition: condition: stable  Dispo: Discharge to home      This transcription was electronically signed. Parts of this transcriptions may have been dictated by use of voice recognition software and electronically transcribed, and parts may have been transcribed with the assistance of an ED scribe. The transcription may contain errors not detected in proofreading. Please refer to my supervising physician's documentation if my documentation differs.     Electronically Signed: Leia Ruano MD, 02/24/22, 6:51 PM         Leia Ruano MD  Resident  02/24/22 0212

## 2022-02-24 NOTE — ED TRIAGE NOTES
Pt states she experienced shoulder blade that radiated down left arm yesterday for approximately 10 minutes while sitting at desk. Pt denies additional pain yesterday. Pt states that today at 1000 while sitting at desk she began experiencing intermittent chest pressure for 30 minutes. Pt denies SOB diaphoresis nausea. 12 lead EKG completed and given to provider. Pt A & O x 3. Respirations are easy & non-labored. Pt denies current pain but does have some numbness in right arm.

## 2022-02-24 NOTE — ED NOTES
Reviewed transfer instructions and to remain NPO. Voiced understanding. A & O x 3. Skin warm and dry. Respirations are easy & non-labored. To lobby via ambulation and gait steady.      Citlaly Graham RN  02/24/22 9375

## 2022-02-24 NOTE — ED PROVIDER NOTES
[address]       Chief Complaint   Patient presents with    Back Pain     shoulder blade into left arm yesterday for approximately 10 minutes    Chest Pain     today at 1000 intermittently for 30 minutes denies SOB diaphoresis nausea       Nurses Notes reviewed and I agree except as noted in the HPI. HISTORY OF PRESENT ILLNESS   Jane Chavez is a 55 y.o. female who presents for evaluation of back pain/chest pressure. Chest pressure was not noted when signing in today. Onset of symptoms yesterday, worsening. Patient states that she had roughly 6 episodes of mid back pain that radiated towards left arm yesterday. Symptoms had resolved. She states the pain started again today with radiation towards right arm. Associated chest pressure. No shortness of breath. No cough. No recent travel, procedures. History of COVID-19, December 2020. Patient notes some changes in activity level recently. She has begun to exercise more. Medical history of GERD, no changes. No current reflux. No changes in diet. No improvement with rest.    REVIEW OF SYSTEMS     Review of Systems   Constitutional: Negative for chills, diaphoresis, fatigue and fever. HENT: Negative for congestion, ear pain, rhinorrhea, sore throat and trouble swallowing. Eyes: Negative for discharge and redness. Respiratory: Negative for cough and shortness of breath. Cardiovascular: Negative for chest pain, palpitations and leg swelling. Gastrointestinal: Negative for diarrhea, nausea and vomiting. Genitourinary: Negative for decreased urine volume. Musculoskeletal: Positive for back pain. Negative for neck pain and neck stiffness. Skin: Negative for rash. Neurological: Negative for headaches. Hematological: Negative for adenopathy. Psychiatric/Behavioral: Negative for sleep disturbance.        PAST MEDICAL HISTORY         Diagnosis Date    Adenomatous colon polyp 09/2017    Heart burn        SURGICAL HISTORY     Patient  has a past surgical history that includes Endometrial ablation (2010); Tubal ligation (1990); Hysterectomy (9/28/2012); and Ovary removal (Bilateral, 09/28/2012). CURRENT MEDICATIONS       Previous Medications    DICYCLOMINE (BENTYL) 10 MG CAPSULE    Take 1 capsule by mouth 4 times daily    ESTRADIOL (ESTRACE) 1 MG TABLET    Take 1 tablet by mouth daily for 90 days. LACTOBACILLUS (CULTURELLE) CAPSULE    Take 1 capsule by mouth daily    MULTIPLE VITAMINS-MINERALS (ALIVE WOMENS 50+) TABS    Take by mouth daily       ALLERGIES     Patient is is allergic to vicodin [hydrocodone-acetaminophen]. FAMILY HISTORY     Patient'sfamily history includes Cervical Cancer in her sister; Heart Attack in her mother; Heart Disease in her mother; Parkinsonism in her mother; Stroke in her mother. SOCIAL HISTORY     Patient  reports that she has never smoked. She has never used smokeless tobacco. She reports current alcohol use. She reports that she does not use drugs. PHYSICAL EXAM     ED TRIAGE VITALS  BP: (!) 147/95, Temp: 97.3 °F (36.3 °C), Pulse: 77, Resp: 16, SpO2: 97 %  Physical Exam  Vitals and nursing note reviewed. Constitutional:       General: She is not in acute distress. Appearance: Normal appearance. She is well-developed. She is not ill-appearing, toxic-appearing or diaphoretic. HENT:      Head: Normocephalic and atraumatic. Right Ear: Hearing, tympanic membrane, ear canal and external ear normal. No mastoid tenderness. No hemotympanum. Tympanic membrane is not perforated, erythematous or bulging. Left Ear: Hearing, tympanic membrane, ear canal and external ear normal. No mastoid tenderness. No hemotympanum. Tympanic membrane is not perforated, erythematous or bulging. Nose: Nose normal.      Mouth/Throat:      Mouth: Mucous membranes are moist.      Pharynx: Oropharynx is clear. Uvula midline. orders placed or performed during the hospital encounter of 02/24/22   EKG 12 Lead   Result Value Ref Range    Ventricular Rate 82 BPM    Atrial Rate 82 BPM    P-R Interval 166 ms    QRS Duration 78 ms    Q-T Interval 394 ms    QTc Calculation (Bazett) 460 ms    P Axis 72 degrees    R Axis 72 degrees    T Axis 64 degrees       IMAGING:  No orders to display     URGENT CARE COURSE:     Vitals:    02/24/22 1255   BP: (!) 147/95   Pulse: 77   Resp: 16   Temp: 97.3 °F (36.3 °C)   TempSrc: Temporal   SpO2: 97%   Weight: 195 lb (88.5 kg)   Height: 5' 4\" (1.626 m)       Medications - No data to display  PROCEDURES:  None  FINALIMPRESSION      1. Chest pressure    2. Acute bilateral thoracic back pain        DISPOSITION/PLAN   DISPOSITION Decision To Transfer 02/24/2022 01:10:16 PM  Patient is going to [address] ED for further evaluation. She is in need of higher level of care including possible laboratory, imaging. She presents with back pain/chest pressure. History of COVID-19. Consider PE rule out. Refused EMS. Advised to go directly to ER. Report called to [facility]. PATIENT REFERRED TO:  [address] [facility] [address] [address] EMERGENCY DEPT  [address]    Go directly to ER.     DISCHARGE MEDICATIONS:  New Prescriptions    No medications on file     Current Discharge Medication List          1425 Conor Benítez, APRN - CNP  02/24/22 Guera Black, APRN - CNP  02/24/22 5488

## 2022-02-25 LAB
EKG ATRIAL RATE: 75 BPM
EKG ATRIAL RATE: 82 BPM
EKG P AXIS: 42 DEGREES
EKG P AXIS: 72 DEGREES
EKG P-R INTERVAL: 158 MS
EKG P-R INTERVAL: 166 MS
EKG Q-T INTERVAL: 392 MS
EKG Q-T INTERVAL: 394 MS
EKG QRS DURATION: 78 MS
EKG QRS DURATION: 78 MS
EKG QTC CALCULATION (BAZETT): 437 MS
EKG QTC CALCULATION (BAZETT): 460 MS
EKG R AXIS: 30 DEGREES
EKG R AXIS: 72 DEGREES
EKG T AXIS: 43 DEGREES
EKG T AXIS: 64 DEGREES
EKG VENTRICULAR RATE: 75 BPM
EKG VENTRICULAR RATE: 82 BPM

## 2022-02-25 PROCEDURE — 93010 ELECTROCARDIOGRAM REPORT: CPT | Performed by: INTERNAL MEDICINE

## 2022-08-30 ENCOUNTER — OFFICE VISIT (OUTPATIENT)
Dept: DIABETES SERVICES | Age: 57
End: 2022-08-30
Payer: COMMERCIAL

## 2022-08-30 VITALS
HEIGHT: 64 IN | SYSTOLIC BLOOD PRESSURE: 104 MMHG | HEART RATE: 84 BPM | WEIGHT: 197 LBS | RESPIRATION RATE: 16 BRPM | DIASTOLIC BLOOD PRESSURE: 72 MMHG | BODY MASS INDEX: 33.63 KG/M2

## 2022-08-30 DIAGNOSIS — E66.09 CLASS 1 OBESITY DUE TO EXCESS CALORIES WITHOUT SERIOUS COMORBIDITY WITH BODY MASS INDEX (BMI) OF 33.0 TO 33.9 IN ADULT: Primary | ICD-10-CM

## 2022-08-30 PROCEDURE — 99205 OFFICE O/P NEW HI 60 MIN: CPT | Performed by: NURSE PRACTITIONER

## 2022-08-30 RX ORDER — VIT C/B6/B5/MAGNESIUM/HERB 173 50-5-6-5MG
1000 CAPSULE ORAL DAILY
COMMUNITY
End: 2022-10-03

## 2022-08-30 ASSESSMENT — PATIENT HEALTH QUESTIONNAIRE - PHQ9
SUM OF ALL RESPONSES TO PHQ9 QUESTIONS 1 & 2: 0
2. FEELING DOWN, DEPRESSED OR HOPELESS: 0
SUM OF ALL RESPONSES TO PHQ QUESTIONS 1-9: 0
1. LITTLE INTEREST OR PLEASURE IN DOING THINGS: 0
SUM OF ALL RESPONSES TO PHQ QUESTIONS 1-9: 0

## 2022-08-30 ASSESSMENT — ENCOUNTER SYMPTOMS
SHORTNESS OF BREATH: 0
RESPIRATORY NEGATIVE: 1
ABDOMINAL PAIN: 0
DIARRHEA: 0

## 2022-08-30 NOTE — PROGRESS NOTES
[facility] [address]  [address] [facility], [address]  [place] [address] [facility]  [phone]        HISTORY:    Yaya Chavez presents today for evaluation and management of:  Chief Complaint   Patient presents with    Weight Loss     Patient interesting in weight loss options. Interval History:         08/30/22   Yaya Chavez is a 65 y.o. female patient who presents to clinic today for her weight she has a history of family history of heart disease and diabetes which contributes to her weight she states they are taking their medications as prescribed without any adverse effects. Has done weight watchers and isogenics. Diet: regular   Exercise: none          Past Medical History:   Diagnosis Date    Adenomatous colon polyp 09/2017    Heart burn      Family History   Problem Relation Age of Onset    Heart Disease Mother     Stroke Mother     Heart Attack Mother     Parkinsonism Mother     Cervical Cancer Sister      Social History     Tobacco Use    Smoking status: Never    Smokeless tobacco: Never   Vaping Use    Vaping Use: Never used   Substance Use Topics    Alcohol use: Yes     Comment: OCASSIONALLY    Drug use: No     Allergies   Allergen Reactions    Vicodin [Hydrocodone-Acetaminophen] Nausea And Vomiting       MEDICATIONS:  Current Outpatient Medications   Medication Sig Dispense Refill    turmeric 500 MG CAPS Take 1,000 mg by mouth daily      Portia, Zingiber officinalis, (PORTIA PO) Take 100 mg by mouth daily      Multiple Vitamins-Minerals (ALIVE WOMENS 50+) TABS Take by mouth daily      estradiol (ESTRACE) 1 MG tablet Take 1 tablet by mouth daily for 90 days. 90 tablet 0     No current facility-administered medications for this visit. Review ofSymptoms:  Review of Systems   Constitutional:  Positive for fatigue. Negative for unexpected weight change. Eyes:  Negative for visual disturbance. Respiratory: Negative. Negative for shortness of breath. Cardiovascular:  Negative for chest pain and leg swelling. Gastrointestinal:  Negative for abdominal pain and diarrhea. Endocrine: Negative for polydipsia, polyphagia and polyuria. Genitourinary: Negative. Musculoskeletal: Negative. Skin:  Negative for rash and wound. Neurological:  Negative for dizziness, tremors, seizures and headaches. Psychiatric/Behavioral: Negative. Negative for confusion and decreased concentration. Theremainder of a complete 14-point review of systems is negative. Vital Signs: /72 (Site: Right Upper Arm, Position: Sitting, Cuff Size: Large Adult)   Pulse 84   Resp 16   Ht 5' 4\" (1.626 m)   Wt 197 lb (89.4 kg)   BMI 33.81 kg/m²      Wt Readings from Last 3 Encounters:   08/30/22 197 lb (89.4 kg)   02/24/22 195 lb (88.5 kg)   06/18/19 174 lb (78.9 kg)     Body mass index is 33.81 kg/m². LABS:  No results found for: LABA1C  No results found for: Yaz De Luna  Lab Results   Component Value Date     02/24/2022    K 4.0 02/24/2022     02/24/2022    CO2 23 02/24/2022    BUN 16 02/24/2022    CREATININE 0.6 02/24/2022    GLUCOSE 98 02/24/2022    CALCIUM 9.4 02/24/2022    PROT 7.1 07/27/2017    LABALBU 4.0 07/27/2017    BILITOT 0.3 07/27/2017    ALKPHOS 73 07/27/2017    AST 9 07/27/2017    ALT 11 07/27/2017    LABGLOM >90 02/24/2022     Lab Results   Component Value Date    CHOL 229 (H) 08/07/2015     Lab Results   Component Value Date    TRIG 91 08/07/2015     Lab Results   Component Value Date    HDL 85 06/18/2019    HDL 91 08/07/2015     Lab Results   Component Value Date    LDLCALC 123 06/18/2019    LDLCALC 120 08/07/2015     No results found for: LABVLDL, VLDL  No results found for: CHOLHDLRATIO        Physical Exam  Constitutional:       Appearance: She is well-developed. Eyes:      Pupils: Pupils are equal, round, and reactive to light.    Neck:      Thyroid: No thyroid mass, thyromegaly or thyroid tenderness. Cardiovascular:      Rate and Rhythm: Normal rate and regular rhythm. Heart sounds: Normal heart sounds. Pulmonary:      Effort: Pulmonary effort is normal.      Breath sounds: Normal breath sounds. Abdominal:      General: Bowel sounds are normal.      Palpations: Abdomen is soft. Skin:     General: Skin is warm and dry. Comments: Negative for open/nonhealing wounds. Negative for lipohypertrophy. Neurological:      Mental Status: She is alert and oriented to person, place, and time. ASSESSMENT/PLAN:     Diagnosis Orders   1. Class 1 obesity due to excess calories without serious comorbidity with body mass index (BMI) of 33.0 to 33.9 in adult          No orders of the defined types were placed in this encounter. No orders of the defined types were placed in this encounter. Requested Prescriptions      No prescriptions requested or ordered in this encounter       1. Class 1 obesity due to excess calories without serious comorbidity with body mass index (BMI) of 33.0 to 33.9 in adult  Reduce calories and increase physical activity to achieve a slow and steady weight loss to improve blood pressure, cholesterol and diabetes. -may consider adding saxenda/wegovy if no improvement -pt states covered by insurance. Patient Instructions   Probiotic (seed)   Fiber supplement   Increase water intake   Keto for 2 weeks then   Then work on fasting for 12 -16 hours   Pea protein shake (orgain)  Document food intake         Answered all patient questions. Agrees to follow plan of care and to follow up in 1 months, sooner if needed. Call office [facility] with [facility] questions or concerns. Total time spent reviewing chart, labs, counseling patient and documenting on the date of the encounter: 60 min.       Electronically signed by JEAN-PIERRE Almodovar CNP on 8/30/2022 at 12:58 PM      (Please note that portions of this note were completed with a voice-recognition program. Efforts were made to edit the dictation but occasionally words are mis-transcribed.)

## 2022-10-03 ENCOUNTER — OFFICE VISIT (OUTPATIENT)
Dept: DIABETES SERVICES | Age: 57
End: 2022-10-03
Payer: COMMERCIAL

## 2022-10-03 VITALS
DIASTOLIC BLOOD PRESSURE: 60 MMHG | SYSTOLIC BLOOD PRESSURE: 92 MMHG | HEIGHT: 64 IN | BODY MASS INDEX: 31.76 KG/M2 | RESPIRATION RATE: 14 BRPM | HEART RATE: 68 BPM | WEIGHT: 186 LBS

## 2022-10-03 DIAGNOSIS — E66.09 CLASS 1 OBESITY DUE TO EXCESS CALORIES WITH SERIOUS COMORBIDITY AND BODY MASS INDEX (BMI) OF 31.0 TO 31.9 IN ADULT: Primary | ICD-10-CM

## 2022-10-03 PROCEDURE — 99214 OFFICE O/P EST MOD 30 MIN: CPT | Performed by: NURSE PRACTITIONER

## 2022-10-03 ASSESSMENT — ENCOUNTER SYMPTOMS
RESPIRATORY NEGATIVE: 1
ABDOMINAL PAIN: 0
DIARRHEA: 0
SHORTNESS OF BREATH: 0

## 2022-10-03 NOTE — PROGRESS NOTES
[address] [facility] INTERNAL MED A DEPARTMENT OF [address] [address]  [address] [facility], [address] [place] [address] [phone]        HISTORY:    Sujatha Chavez presents today for evaluation and management of:  Chief Complaint   Patient presents with    1 Month Follow-Up     Weight loss         Interval History:    Past medications and diet tried  None    08/30/22   Sujatha Chavez is a 65 y.o. female patient who presents to clinic today for her weight she has a history of family history of heart disease and diabetes which contributes to her weight she states they are taking their medications as prescribed without any adverse effects. Has done weight watchers and isogenics. Diet: regular   Exercise: none       10/03/22   Sujatha Chavez is a 65 y.o. female patient who presents to clinic today for her weight she has a history of family history of heart disease and diabetes  which contributes to her weight At previous visit weight loss strategies discussed. she states they are taking their medications as prescribed without any adverse effects. Diet: counting carbs and calories, IF 16:8, documenting   Exercise: none  Total weight loss: 11 lbs           Past Medical History:   Diagnosis Date    Adenomatous colon polyp 09/2017    Heart burn      Family History   Problem Relation Age of Onset    Heart Disease Mother     Stroke Mother     Heart Attack Mother     Parkinsonism Mother     Cervical Cancer Sister      Social History     Tobacco Use    Smoking status: Never    Smokeless tobacco: Never   Vaping Use    Vaping Use: Never used   Substance Use Topics    Alcohol use: Yes     Comment: OCASSIONALLY    Drug use: No     Allergies   Allergen Reactions    Vicodin [Hydrocodone-Acetaminophen] Nausea And Vomiting       MEDICATIONS:  Current Outpatient Medications   Medication Sig Dispense Refill    Probiotic Product (PROBIOTIC ADVANCED PO) 2 capsules daily.       estradiol (ESTRACE) 1 MG tablet Take 1 tablet by mouth daily for 90 days. 90 tablet 0     No current facility-administered medications for this visit. Review ofSymptoms:  Review of Systems   Constitutional:  Positive for fatigue. Negative for unexpected weight change. Eyes:  Negative for visual disturbance. Respiratory: Negative. Negative for shortness of breath. Cardiovascular:  Negative for chest pain and leg swelling. Gastrointestinal:  Negative for abdominal pain and diarrhea. Endocrine: Negative for polydipsia, polyphagia and polyuria. Genitourinary: Negative. Musculoskeletal: Negative. Skin:  Negative for rash and wound. Neurological:  Negative for dizziness, tremors, seizures and headaches. Psychiatric/Behavioral: Negative. Negative for confusion and decreased concentration. Theremainder of a complete 14-point review of systems is negative. Vital Signs: BP 92/60 (Site: Right Upper Arm, Position: Sitting, Cuff Size: Medium Adult)   Pulse 68   Resp 14   Ht 5' 4\" (1.626 m)   Wt 186 lb (84.4 kg)   BMI 31.93 kg/m²      Wt Readings from Last 3 Encounters:   10/03/22 186 lb (84.4 kg)   08/30/22 197 lb (89.4 kg)   02/24/22 195 lb (88.5 kg)     Body mass index is 31.93 kg/m².   LABS:  No results found for: LABA1C  No results found for: Eileen Miller  Lab Results   Component Value Date     02/24/2022    K 4.0 02/24/2022     02/24/2022    CO2 23 02/24/2022    BUN 16 02/24/2022    CREATININE 0.6 02/24/2022    GLUCOSE 98 02/24/2022    CALCIUM 9.4 02/24/2022    PROT 7.1 07/27/2017    LABALBU 4.0 07/27/2017    BILITOT 0.3 07/27/2017    ALKPHOS 73 07/27/2017    AST 9 07/27/2017    ALT 11 07/27/2017    LABGLOM >90 02/24/2022     Lab Results   Component Value Date    CHOL 229 (H) 08/07/2015     Lab Results   Component Value Date    TRIG 91 08/07/2015     Lab Results   Component Value Date    HDL 85 06/18/2019    HDL 91 08/07/2015     Lab Results   Component Value Date    LDLCALC 123 06/18/2019 [address] 08/07/2015     No results found for: LABVLDL, VLDL  No results found for: CHOLHDLRATIO        Physical Exam  Constitutional:       Appearance: She is well-developed. Eyes:      Pupils: Pupils are equal, round, and reactive to light. Neck:      Thyroid: No thyroid mass, thyromegaly or thyroid tenderness. Cardiovascular:      Rate and Rhythm: Normal rate and regular rhythm. Heart sounds: Normal heart sounds. Pulmonary:      Effort: Pulmonary effort is normal.      Breath sounds: Normal breath sounds. Abdominal:      General: Bowel sounds are normal.      Palpations: Abdomen is soft. Skin:     General: Skin is warm and dry. Comments: Negative for open/nonhealing wounds. Negative for lipohypertrophy. Neurological:      Mental Status: She is alert and oriented to person, place, and time. ASSESSMENT/PLAN:     Diagnosis Orders   1. Class 1 obesity due to excess calories with serious comorbidity and body mass index (BMI) of 31.0 to 31.9 in adult          No orders of the defined types were placed in this encounter. No orders of the defined types were placed in this encounter. Requested Prescriptions      No prescriptions requested or ordered in this encounter       1. Class 1 obesity due to excess calories with serious comorbidity and body mass index (BMI) of 31.0 to 31.9 in adult  Reduce calories and increase physical activity to achieve a slow and steady weight loss to improve blood pressure, cholesterol and diabetes. -continue low carb, 30 grams per meal, keto, and IF rotation       Answered all patient questions. Agrees to follow plan of care and to follow up in 2 months, sooner if needed. Call office [facility] with any further questions or concerns. Total time spent reviewing chart, labs, counseling patient and documenting on the date of the encounter: 30 min.       Electronically signed by JEAN-PIERRE Vaughan CNP on 10/3/2022 at 2:54 PM      (Please note that portions of this note were completed with a voice-recognition program. Efforts were made to edit the dictation but occasionally words are mis-transcribed.)

## 2023-05-11 ENCOUNTER — HOSPITAL ENCOUNTER (OUTPATIENT)
Dept: WOMENS IMAGING | Age: 58
Discharge: HOME | End: 2023-05-11
Payer: COMMERCIAL

## 2023-05-11 DIAGNOSIS — Z12.31 VISIT FOR SCREENING MAMMOGRAM: ICD-10-CM

## 2023-05-11 PROCEDURE — 77063 BREAST TOMOSYNTHESIS BI: CPT

## 2024-03-11 ENCOUNTER — OFFICE VISIT (OUTPATIENT)
Dept: DIABETES SERVICES | Age: 59
End: 2024-03-11
Payer: COMMERCIAL

## 2024-03-11 VITALS
DIASTOLIC BLOOD PRESSURE: 60 MMHG | HEART RATE: 70 BPM | HEIGHT: 64 IN | BODY MASS INDEX: 35.2 KG/M2 | WEIGHT: 206.2 LBS | OXYGEN SATURATION: 99 % | SYSTOLIC BLOOD PRESSURE: 120 MMHG

## 2024-03-11 DIAGNOSIS — E66.01 CLASS 2 SEVERE OBESITY DUE TO EXCESS CALORIES WITH SERIOUS COMORBIDITY AND BODY MASS INDEX (BMI) OF 35.0 TO 35.9 IN ADULT (HCC): Primary | ICD-10-CM

## 2024-03-11 PROCEDURE — 99214 OFFICE O/P EST MOD 30 MIN: CPT | Performed by: NURSE PRACTITIONER

## 2024-03-11 RX ORDER — M-VIT,TX,IRON,MINS/CALC/FOLIC 27MG-0.4MG
1 TABLET ORAL DAILY
COMMUNITY

## 2024-03-11 ASSESSMENT — ENCOUNTER SYMPTOMS
SHORTNESS OF BREATH: 0
DIARRHEA: 0
ABDOMINAL PAIN: 0
RESPIRATORY NEGATIVE: 1

## 2024-03-11 NOTE — PROGRESS NOTES
[facility]  [address] [facility] [address] [phone]        HISTORY:    Carly Chavez presents today for evaluation and management of:  Chief Complaint   Patient presents with    Weight Management     Wants to reevaluate weight loss options.          Interval History:    Past medications and diet tried  none      10/03/22   Carly Chavez is a 57 y.o. female patient who presents to clinic today for her weight she has a history of family history of heart disease and diabetes  which contributes to her weight At previous visit weight loss strategies discussed. she states they are taking their medications as prescribed without any adverse effects.   Diet: counting carbs and calories, IF 16:8, documenting   Exercise: none  Total weight loss: 11 lbs     03/11/24   Carly Chavez is a 58 y.o. female patient who presents to clinic today for her weight she has a history of family history of heart disease and diabetes which contributes to her weight At previous visit discussed lifestyle. she states they are taking their medications as prescribed without any adverse effects.   Diet: stopped IF and keto due to hair loss  Exercise: none  Weight loss goal: 20%  Total weight loss: 0          Past Medical History:   Diagnosis Date    Adenomatous colon polyp 09/2017    Heart burn      Family History   Problem Relation Age of Onset    Heart Disease Mother     Stroke Mother     Heart Attack Mother     Parkinsonism Mother     Cervical Cancer Sister      Social History     Tobacco Use    Smoking status: Never    Smokeless tobacco: Never   Vaping Use    Vaping Use: Never used   Substance Use Topics    Alcohol use: Yes     Comment: OCASSIONALLY    Drug use: No     Allergies   Allergen Reactions    Vicodin [Hydrocodone-Acetaminophen] Nausea And Vomiting       MEDICATIONS:  Current Outpatient Medications   Medication Sig Dispense Refill

## 2024-03-26 ENCOUNTER — TELEPHONE (OUTPATIENT)
Dept: INTERNAL MEDICINE | Age: 59
End: 2024-03-26

## 2024-03-26 NOTE — TELEPHONE ENCOUNTER
Patient called in requesting a script for the compound for weight loss at [facility] in [place]. She said CK would have to send over the Rx then [facility] calls her to confirm and take payment then they make the Rx for her. Her insurance doesn't cover the Ozempic that was sent over before this. Her insurance will not cover this compound script either but pt is willing to pay cash. Pt could not remember that name of the script though. Please advise if agreeable. Patient number confirmed with one on file if needed.

## 2024-11-06 ENCOUNTER — TELEPHONE (OUTPATIENT)
Dept: FAMILY MEDICINE CLINIC | Age: 59
End: 2024-11-06

## 2024-11-06 NOTE — TELEPHONE ENCOUNTER
----- Message from Kervin YATES sent at 11/6/2024  3:58 PM EST -----  Regarding: [facility] Appointment Request  [facility] Appointment Request    Patient needs appointment for [facility] Appointment Type: New Patient.    Patient Requested Dates(s): within the month of November  Patient Requested Time: morning  Provider Name: Enma Jameson DO    Reason for Appointment Request: New Patient - Requested Provider unavailable  --------------------------------------------------------------------------------------------------------------------------    Relationship to Patient: Self     Call Back Information: OK to leave message on voicemail  Preferred Call Back Number: Phone [phone]

## 2024-12-16 ENCOUNTER — HOSPITAL ENCOUNTER (EMERGENCY)
Age: 59
Discharge: HOME | End: 2024-12-16
Payer: COMMERCIAL

## 2024-12-16 VITALS
BODY MASS INDEX: 35.85 KG/M2 | TEMPERATURE: 97 F | DIASTOLIC BLOOD PRESSURE: 79 MMHG | OXYGEN SATURATION: 99 % | RESPIRATION RATE: 16 BRPM | HEART RATE: 68 BPM | WEIGHT: 210 LBS | HEIGHT: 64 IN | SYSTOLIC BLOOD PRESSURE: 132 MMHG

## 2024-12-16 DIAGNOSIS — J01.40 ACUTE PANSINUSITIS, RECURRENCE NOT SPECIFIED: Primary | ICD-10-CM

## 2024-12-16 PROCEDURE — 99213 OFFICE O/P EST LOW 20 MIN: CPT | Performed by: NURSE PRACTITIONER

## 2024-12-16 PROCEDURE — 99213 OFFICE O/P EST LOW 20 MIN: CPT

## 2024-12-16 RX ORDER — IBUPROFEN 200 MG
800 TABLET ORAL EVERY 6 HOURS PRN
COMMUNITY

## 2024-12-16 RX ORDER — AZITHROMYCIN 250 MG/1
TABLET, FILM COATED ORAL
Qty: 1 PACKET | Refills: 0 | Status: SHIPPED | OUTPATIENT
Start: 2024-12-16 | End: 2024-12-20

## 2024-12-16 ASSESSMENT — PAIN DESCRIPTION - DESCRIPTORS: DESCRIPTORS: ACHING

## 2024-12-16 ASSESSMENT — PAIN DESCRIPTION - LOCATION: LOCATION: HEAD

## 2024-12-16 ASSESSMENT — ENCOUNTER SYMPTOMS
NAUSEA: 0
SHORTNESS OF BREATH: 0
DIARRHEA: 0
VOMITING: 0
CHEST TIGHTNESS: 0
COUGH: 0
SORE THROAT: 1
RHINORRHEA: 0
SINUS PRESSURE: 1

## 2024-12-16 ASSESSMENT — PAIN SCALES - GENERAL: PAINLEVEL_OUTOF10: 6

## 2024-12-16 ASSESSMENT — PAIN DESCRIPTION - PAIN TYPE: TYPE: ACUTE PAIN

## 2024-12-16 ASSESSMENT — PAIN - FUNCTIONAL ASSESSMENT: PAIN_FUNCTIONAL_ASSESSMENT: 0-10

## 2024-12-16 ASSESSMENT — PAIN DESCRIPTION - ORIENTATION: ORIENTATION: ANTERIOR

## 2024-12-16 ASSESSMENT — PAIN DESCRIPTION - FREQUENCY: FREQUENCY: INTERMITTENT

## 2024-12-16 ASSESSMENT — PAIN DESCRIPTION - ONSET: ONSET: GRADUAL

## 2024-12-16 NOTE — ED TRIAGE NOTES
Arrives to [facility] for the evaluation of possible sinus infection.  Reports having chills, bilateral ear pain, frontal headache, sore throat, post nasal drainage and \"teeth hurt\".  Symptoms started 3 days ago.  Afebrile.  Respirations unlabored, no cough.  Took Advil 800 mg today around 0600.  At this time headache rated 6/10 in severity.  Waiting provider to assess for further orders.

## 2024-12-16 NOTE — ED PROVIDER NOTES
[facility] CARE [place]  Urgent Care Encounter       CHIEF COMPLAINT       Chief Complaint   Patient presents with    Ear Pain     Bilateral     Chills    Pharyngitis    Headache     Sinus- \"Teeth hurt\"        Nurses Notes reviewed and I agree except as noted in the HPI.  HISTORY OF PRESENT ILLNESS   Carly Chavez is a 59 y.o. female who presents to the [facility] for evaluation of sinus pressure.  She reports her symptoms started roughly 4 to 5 days ago.  Reports her symptoms as headache, sinus pressure, pharyngitis, chills, and otalgia.  She denies known fever.  Denies known exposure to someone ill.  Denies nausea, vomiting, diarrhea.    The history is provided by the patient. No  was used.       REVIEW OF SYSTEMS     Review of Systems   Constitutional:  Positive for chills. Negative for activity change, appetite change, fatigue and fever.   HENT:  Positive for ear pain, sinus pressure and sore throat. Negative for ear discharge and rhinorrhea.    Respiratory:  Negative for cough, chest tightness and shortness of breath.    Cardiovascular:  Negative for chest pain.   Gastrointestinal:  Negative for diarrhea, nausea and vomiting.   Genitourinary:  Negative for dysuria.   Skin:  Negative for rash.   Allergic/Immunologic: Negative for environmental allergies and food allergies.   Neurological:  Positive for headaches. Negative for dizziness.       PAST MEDICAL HISTORY         Diagnosis Date    Adenomatous colon polyp 09/2017    Heart burn        SURGICALHISTORY     Patient  has a past surgical history that includes Endometrial ablation (2010); Tubal ligation (1990); Hysterectomy (9/28/2012); and Ovary removal (Bilateral, 09/28/2012).    CURRENT MEDICATIONS       Discharge Medication List as of 12/16/2024 12:53 PM        CONTINUE these medications which have NOT CHANGED    Details   ibuprofen (ADVIL;MOTRIN) 200 MG tablet Take 4 tablets by mouth every 6 hours as needed for

## 2025-01-08 ENCOUNTER — HOSPITAL ENCOUNTER (EMERGENCY)
Age: 60
Discharge: HOME | End: 2025-01-08
Payer: COMMERCIAL

## 2025-01-08 VITALS
OXYGEN SATURATION: 96 % | TEMPERATURE: 97.2 F | RESPIRATION RATE: 16 BRPM | SYSTOLIC BLOOD PRESSURE: 151 MMHG | DIASTOLIC BLOOD PRESSURE: 76 MMHG | HEART RATE: 90 BPM

## 2025-01-08 DIAGNOSIS — J40 SINOBRONCHITIS: Primary | ICD-10-CM

## 2025-01-08 DIAGNOSIS — J32.9 SINOBRONCHITIS: Primary | ICD-10-CM

## 2025-01-08 PROCEDURE — 99213 OFFICE O/P EST LOW 20 MIN: CPT | Performed by: NURSE PRACTITIONER

## 2025-01-08 PROCEDURE — 99213 OFFICE O/P EST LOW 20 MIN: CPT

## 2025-01-08 RX ORDER — BENZONATATE 200 MG/1
200 CAPSULE ORAL 3 TIMES DAILY PRN
Qty: 30 CAPSULE | Refills: 0 | Status: SHIPPED | OUTPATIENT
Start: 2025-01-08 | End: 2025-01-15

## 2025-01-08 RX ORDER — PREDNISONE 20 MG/1
20 TABLET ORAL 2 TIMES DAILY
Qty: 10 TABLET | Refills: 0 | Status: SHIPPED | OUTPATIENT
Start: 2025-01-08 | End: 2025-01-13

## 2025-01-08 ASSESSMENT — ENCOUNTER SYMPTOMS
NAUSEA: 0
VOMITING: 0
RHINORRHEA: 1
CHEST TIGHTNESS: 1
SHORTNESS OF BREATH: 0
SORE THROAT: 1
COUGH: 1
DIARRHEA: 0

## 2025-01-08 NOTE — ED NOTES
To [facility] with complaints of ear pain, sore throat, cough. Started in Dec. Given zpak and mucinex d. States she never got all the way better.      Astrid Ramirez, RN  01/08/25 [id number]

## 2025-01-08 NOTE — ED PROVIDER NOTES
[facility] EMERGENCY DEPARTMENT  Urgent Care Encounter       CHIEF COMPLAINT       Chief Complaint   Patient presents with    Cough    Ear Pain    Congestion       Nurses Notes reviewed and I agree except as noted in the HPI.  HISTORY OF PRESENT ILLNESS   Carly Chavez is a 59 y.o. female who presents to the [facility] for evaluation of cough.  She reports that her symptoms started roughly 3 weeks ago.  She reports that she was seen and evaluated here on 12/16/2024.  At that time she was diagnosed with a sinus infection.  She was prescribed azithromycin.  She reports that she has been using over-the-counter Mucinex with only minimal relief.  She reports that the symptoms have progressed from congestion, rhinorrhea, postnasal drainage, otalgia, and a scratchy throat 2 cough with chest congestion.  Denies fever, does report chills.  Denies nausea, vomiting or diarrhea.    The history is provided by the patient. No  was used.       REVIEW OF SYSTEMS     Review of Systems   Constitutional:  Negative for activity change, appetite change, chills, fatigue and fever.   HENT:  Positive for congestion, ear pain, postnasal drip, rhinorrhea and sore throat. Negative for ear discharge.    Respiratory:  Positive for cough and chest tightness. Negative for shortness of breath.    Cardiovascular:  Negative for chest pain.   Gastrointestinal:  Negative for diarrhea, nausea and vomiting.   Genitourinary:  Negative for dysuria.   Skin:  Negative for rash.   Allergic/Immunologic: Negative for environmental allergies and food allergies.   Neurological:  Negative for dizziness and headaches.       PAST MEDICAL HISTORY         Diagnosis Date    Adenomatous colon polyp 09/2017    Heart burn        SURGICALHISTORY     Patient  has a past surgical history that includes Endometrial ablation (2010); Tubal ligation (1990); Hysterectomy (9/28/2012); and Ovary removal (Bilateral,  on file.  No primary physician on file.      DISCHARGE MEDICATIONS:  New Prescriptions    AMOXICILLIN-CLAVULANATE (AUGMENTIN) 875-125 MG PER TABLET    Take 1 tablet by mouth 2 times daily for 7 days    BENZONATATE (TESSALON) 200 MG CAPSULE    Take 1 capsule by mouth 3 times daily as needed for Cough    PREDNISONE (DELTASONE) 20 MG TABLET    Take 1 tablet by mouth 2 times daily for 5 days       Discontinued Medications    No medications on file       Current Discharge Medication List          JEAN-PIERRE Morgan - CNP    (Please note that portions of this note were completed with a voice recognition program. Efforts were made to edit the dictations but occasionally words are mis-transcribed.)            Roldan Ignacio, JEAN-PIERRE - CNP  01/08/25 [id number]

## 2025-06-24 ENCOUNTER — HOSPITAL ENCOUNTER (OUTPATIENT)
Dept: WOMENS IMAGING | Age: 60
Discharge: HOME | End: 2025-06-24
Payer: COMMERCIAL

## 2025-06-24 VITALS — HEIGHT: 64 IN | BODY MASS INDEX: 34.15 KG/M2 | WEIGHT: 200 LBS

## 2025-06-24 DIAGNOSIS — Z12.31 VISIT FOR SCREENING MAMMOGRAM: ICD-10-CM

## 2025-06-24 PROCEDURE — 77063 BREAST TOMOSYNTHESIS BI: CPT

## 2025-07-08 ENCOUNTER — APPOINTMENT (OUTPATIENT)
Dept: GENERAL RADIOLOGY | Age: 60
End: 2025-07-08
Payer: COMMERCIAL

## 2025-07-08 ENCOUNTER — HOSPITAL ENCOUNTER (EMERGENCY)
Age: 60
Discharge: HOME | End: 2025-07-08
Attending: EMERGENCY MEDICINE
Payer: COMMERCIAL

## 2025-07-08 VITALS
HEART RATE: 67 BPM | WEIGHT: 198 LBS | OXYGEN SATURATION: 99 % | RESPIRATION RATE: 20 BRPM | SYSTOLIC BLOOD PRESSURE: 106 MMHG | DIASTOLIC BLOOD PRESSURE: 82 MMHG | TEMPERATURE: 97.6 F | BODY MASS INDEX: 33.99 KG/M2

## 2025-07-08 DIAGNOSIS — R07.9 CHEST PAIN, UNSPECIFIED TYPE: Primary | ICD-10-CM

## 2025-07-08 LAB
ALBUMIN SERPL BCG-MCNC: 3.7 G/DL (ref 3.4–4.9)
ALP SERPL-CCNC: 74 U/L (ref 38–126)
ALT SERPL W/O P-5'-P-CCNC: 9 U/L (ref 10–35)
ANION GAP SERPL CALC-SCNC: 14 MEQ/L (ref 8–16)
AST SERPL-CCNC: 14 U/L (ref 10–35)
BASOPHILS ABSOLUTE: 0.1 THOU/MM3 (ref 0–0.1)
BASOPHILS NFR BLD AUTO: 0.9 %
BILIRUB SERPL-MCNC: 0.3 MG/DL (ref 0.3–1.2)
BUN SERPL-MCNC: 14 MG/DL (ref 8–23)
CALCIUM SERPL-MCNC: 9.3 MG/DL (ref 8.8–10.2)
CHLORIDE SERPL-SCNC: 106 MEQ/L (ref 98–111)
CO2 SERPL-SCNC: 19 MEQ/L (ref 22–29)
CREAT SERPL-MCNC: 0.6 MG/DL (ref 0.5–0.9)
D DIMER PPP IA.FEU-MCNC: 288 NG/ML FEU (ref 0–500)
DEPRECATED RDW RBC AUTO: 43.7 FL (ref 35–45)
EKG ATRIAL RATE: 61 BPM
EKG ATRIAL RATE: 61 BPM
EKG P AXIS: 47 DEGREES
EKG P AXIS: 50 DEGREES
EKG P-R INTERVAL: 164 MS
EKG P-R INTERVAL: 170 MS
EKG Q-T INTERVAL: 416 MS
EKG Q-T INTERVAL: 426 MS
EKG QRS DURATION: 74 MS
EKG QRS DURATION: 80 MS
EKG QTC CALCULATION (BAZETT): 418 MS
EKG QTC CALCULATION (BAZETT): 428 MS
EKG R AXIS: 43 DEGREES
EKG R AXIS: 45 DEGREES
EKG T AXIS: 46 DEGREES
EKG T AXIS: 49 DEGREES
EKG VENTRICULAR RATE: 61 BPM
EKG VENTRICULAR RATE: 61 BPM
EOSINOPHIL NFR BLD AUTO: 3.3 %
EOSINOPHILS ABSOLUTE: 0.2 THOU/MM3 (ref 0–0.4)
ERYTHROCYTE [DISTWIDTH] IN BLOOD BY AUTOMATED COUNT: 12.3 % (ref 11.5–14.5)
GFR SERPL CREATININE-BSD FRML MDRD: > 90 ML/MIN/1.73M2
GLUCOSE SERPL-MCNC: 103 MG/DL (ref 74–109)
HCT VFR BLD AUTO: 42.7 % (ref 37–47)
HGB BLD-MCNC: 14.2 GM/DL (ref 12–16)
IMM GRANULOCYTES # BLD AUTO: 0.01 THOU/MM3 (ref 0–0.07)
IMM GRANULOCYTES NFR BLD AUTO: 0.1 %
INR PPP: 0.88 (ref 0.85–1.13)
LIPASE SERPL-CCNC: 21 U/L (ref 13–60)
LYMPHOCYTES ABSOLUTE: 3.5 THOU/MM3 (ref 1–4.8)
LYMPHOCYTES NFR BLD AUTO: 46 %
MAGNESIUM SERPL-MCNC: 2.2 MG/DL (ref 1.6–2.6)
MCH RBC QN AUTO: 32.1 PG (ref 26–33)
MCHC RBC AUTO-ENTMCNC: 33.3 GM/DL (ref 32.2–35.5)
MCV RBC AUTO: 96.6 FL (ref 81–99)
MONOCYTES ABSOLUTE: 0.6 THOU/MM3 (ref 0.4–1.3)
MONOCYTES NFR BLD AUTO: 7.7 %
NEUTROPHILS ABSOLUTE: 3.2 THOU/MM3 (ref 1.8–7.7)
NEUTROPHILS NFR BLD AUTO: 42 %
NRBC BLD AUTO-RTO: 0 /100 WBC
OSMOLALITY SERPL CALC.SUM OF ELEC: 278.3 MOSMOL/KG (ref 275–300)
PLATELET # BLD AUTO: 265 THOU/MM3 (ref 130–400)
PMV BLD AUTO: 9.5 FL (ref 9.4–12.4)
POTASSIUM SERPL-SCNC: 4 MEQ/L (ref 3.5–5.2)
PROT SERPL-MCNC: 6.2 G/DL (ref 6.4–8.3)
PROTHROMBIN TIME: 10 SECONDS (ref 10–13.5)
RBC # BLD AUTO: 4.42 MILL/MM3 (ref 4.2–5.4)
SODIUM SERPL-SCNC: 139 MEQ/L (ref 135–145)
TROPONIN, HIGH SENSITIVITY: < 6 NG/L (ref 0–12)
TROPONIN, HIGH SENSITIVITY: < 6 NG/L (ref 0–12)
WBC # BLD AUTO: 7.5 THOU/MM3 (ref 4.8–10.8)

## 2025-07-08 PROCEDURE — 85379 FIBRIN DEGRADATION QUANT: CPT

## 2025-07-08 PROCEDURE — 99285 EMERGENCY DEPT VISIT HI MDM: CPT

## 2025-07-08 PROCEDURE — 80053 COMPREHEN METABOLIC PANEL: CPT

## 2025-07-08 PROCEDURE — 83735 ASSAY OF MAGNESIUM: CPT

## 2025-07-08 PROCEDURE — 83690 ASSAY OF LIPASE: CPT

## 2025-07-08 PROCEDURE — 93005 ELECTROCARDIOGRAM TRACING: CPT

## 2025-07-08 PROCEDURE — 6370000000 HC RX 637 (ALT 250 FOR IP)

## 2025-07-08 PROCEDURE — 85610 PROTHROMBIN TIME: CPT

## 2025-07-08 PROCEDURE — 85025 COMPLETE CBC W/AUTO DIFF WBC: CPT

## 2025-07-08 PROCEDURE — 84484 ASSAY OF TROPONIN QUANT: CPT

## 2025-07-08 PROCEDURE — 36415 COLL VENOUS BLD VENIPUNCTURE: CPT

## 2025-07-08 PROCEDURE — 71045 X-RAY EXAM CHEST 1 VIEW: CPT

## 2025-07-08 RX ORDER — HYDROCODONE BITARTRATE AND ACETAMINOPHEN 5; 325 MG/1; MG/1
1 TABLET ORAL ONCE
Refills: 0 | Status: DISCONTINUED | OUTPATIENT
Start: 2025-07-08 | End: 2025-07-08 | Stop reason: HOSPADM

## 2025-07-08 ASSESSMENT — PAIN DESCRIPTION - LOCATION: LOCATION: ARM;CHEST

## 2025-07-08 ASSESSMENT — PAIN - FUNCTIONAL ASSESSMENT
PAIN_FUNCTIONAL_ASSESSMENT: NONE - DENIES PAIN
PAIN_FUNCTIONAL_ASSESSMENT: 0-10
PAIN_FUNCTIONAL_ASSESSMENT: NONE - DENIES PAIN
PAIN_FUNCTIONAL_ASSESSMENT: NONE - DENIES PAIN

## 2025-07-08 ASSESSMENT — PAIN DESCRIPTION - ORIENTATION: ORIENTATION: RIGHT

## 2025-07-08 ASSESSMENT — PAIN SCALES - GENERAL
PAINLEVEL_OUTOF10: 9
PAINLEVEL_OUTOF10: 5

## 2025-07-08 NOTE — ED NOTES
Pt resting in cot with family at bedside. Pt denies any pain, numbness, tingling, or dizziness. Respirations even and unlabored.

## 2025-07-08 NOTE — ED PROVIDER NOTES
I performed a history and physical examination of the patient and discussed management with the resident. I reviewed the resident’s note and agree with the documented findings and plan of care. Any areas of disagreement are noted on the chart. I was personally present for the key portions of any procedures. I have documented in the chart those procedures where I was not present during the key portions. I have reviewed the emergency nurses triage note. I agree with the chief complaint, past medical history, past surgical history, allergies, medications, social and family history as documented unless otherwise noted below. Documentation of the HPI, Physical Exam and Medical Decision Making performed by medical students or scribes is based on my personal performance of the HPI, PE and MDM. For Phys Assistant/ Nurse Practitioner cases/documentation I have personally evaluated this patient and have completed at least one if not all key elements of the E/M (history, physical exam, and MDM). My findings are as noted below.    In other words, I personally saw and examined the patient I have reviewed and agreed with the resident findings including all diagnostic interpretations and treatment plans as written.  I was present for the key portion of any procedures performed and the inclusive time noted in any critical care statement.    Patient presents today for chest pain.  This is a 60-year-old female who was waiting in a car and started experiencing right-sided chest pain right-sided arm pain.  Patient states that it bothered her and she decided to come in for evaluation and treatment.  Patient states she has not had any coronary artery disease diagnosed in her.  No one in the family has had heart attack before the age of 55.  Patient does not smoke, she does drink occasional alcohol, does no drugs.  She is not taking medications for hypertension hyperlipidemia diabetes.  No history of congestive heart failure.  By the time

## 2025-07-08 NOTE — ED PROVIDER NOTES
[facility] EMERGENCY DEPARTMENT  EMERGENCY DEPARTMENT ENCOUNTER          Pt Name: Carly Chavez  MRN: [id number]  Birthdate 1965  Date of evaluation: 7/8/2025  Physician: Silverio Pires MD  Supervising Attending Physician: Colton Harper DO       CHIEF COMPLAINT       Chief Complaint   Patient presents with    Chest Pain         HISTORY OF PRESENT ILLNESS    HPI  Carly Chavez is a 60 y.o. female who presents to the emergency department from home, brought in by EMS for evaluation of chest pain.  The patient reports that she developed chest pain on the right side of her chest approximately 1/2-hour prior to arrival.  This pain radiated down her right arm into her shoulder blade.  The patient reports no cardiac history.  She was brought in by EMS and she did receive aspirin prior to arrival.  The patient denies any viral URI type symptoms to include cough, rhinorrhea, chest congestion recently.  Her  is in the room and denies any recent confusion and endorses her story regarding viral URI type symptoms.  The patient denies any new workouts, chest trauma, also denies any nausea or vomiting associated with this.  The patient denies any change in bowel or bladder habits.  The patient denies any recent travel in planes or cars, no calf pain tenderness or swelling.  She also denies any sick contacts.  The patient does not see a cardiologist.  The patient endorses the pain is in the right side of her chest with radiation to her right arm and shoulder blade.  She states that this is a pressure type sensation that is constant nature and not reproducible with palpation.  Patient denies any recent new life stresses.  The patient has no other acute complaints at this time.      REVIEW OF SYSTEMS   Review of Systems      PAST MEDICAL AND SURGICAL HISTORY     Past Medical History:   Diagnosis Date    Adenomatous colon polyp 09/2017    Heart burn      Past Surgical History:   Procedure

## 2025-07-08 NOTE — DISCHARGE INSTRUCTIONS
You were evaluated emergency room today for chest pain.  We could not find any emergency causes related to your chest pain, so we are discharging you home.  However, given your symptoms, I would like you to follow-up with cardiology.  I scheduled you for an appointment with our cardiology department on July 10, 2025 at 9:30 AM.  The location and contact remission for this department will be located within your discharge instructions.  Please call the department if you cannot make it to the appointment.  Please contact the emergency room if developing any worsening chest pain/pressure tightness or shortness of breath, or if your family notices that you are confused, or if you feel lightheaded/dizzy.

## 2025-07-08 NOTE — ED TRIAGE NOTES
Pt presents to the ED via EMS for complaints of chest pain. Pt states that it began about 30-45 minutes ago. Pt states that it is radiating to her R arm and states that her R arm is numb. Pt denies any heart hx but states that she has a hx of anxiety. EMS reports giving 1 324mg aspirin. Pt states her pain a 9/10.

## 2025-07-08 NOTE — ED NOTES
Pt resting in cot with visitors at bedside. Pt states she is having no pain and is having no numbness or tingling. Respirations are even and unlabored.

## 2025-07-10 ENCOUNTER — OFFICE VISIT (OUTPATIENT)
Dept: CARDIOLOGY CLINIC | Age: 60
End: 2025-07-10

## 2025-07-10 VITALS
SYSTOLIC BLOOD PRESSURE: 132 MMHG | DIASTOLIC BLOOD PRESSURE: 70 MMHG | WEIGHT: 203.8 LBS | HEIGHT: 64 IN | BODY MASS INDEX: 34.79 KG/M2 | HEART RATE: 76 BPM

## 2025-07-10 DIAGNOSIS — Z91.89 SEDENTARY LIFESTYLE: ICD-10-CM

## 2025-07-10 DIAGNOSIS — R07.89 OTHER CHEST PAIN: Primary | ICD-10-CM

## 2025-07-10 DIAGNOSIS — E66.9 OBESITY (BMI 30-39.9): ICD-10-CM

## 2025-07-10 DIAGNOSIS — E78.5 DYSLIPIDEMIA: ICD-10-CM

## 2025-07-10 NOTE — PROGRESS NOTES
Cardiology Clinic Consultation        Problem List      Patient Active Problem List   Diagnosis    Adenomatous colon polyp        Reason for the Consult     Chest pain     History of the Present Illness     60-year-old female with history of dyslipidemia and obesity (BMI: 33) who presented to the clinic today for follow-up after an ED visit for chest pain.  She presented to the ED 7/8/2025 for evaluation of chest pain that was right-sided and also in the right arm when she was waiting in a car.  Denies any prior history of MI or CHF.  EKGs showed normal sinus rhythm, incomplete right bundle branch block with no significant ST or T wave changes  Troponin was negative x 2  D-dimer was negative    On today's visit, the patient reported that the chest pain that she experienced with happened when she was in the waiting area with her  in the ophthalmology office and it was substernal and radiating to the right shoulder and the back.  She reported she was not stressed out at that time.  She reported that it relieved spontaneously.  Denies any diaphoresis, nausea or vomiting concomitantly with the chest pain.  He denies any prior episodes of chest pain.  Reported that she trimmed 16 pushes last week.  She reported that her home diet regimen needs to be changed for the better.  Reported that she is not physically active currently and she has a desk job.     Past Medical & Surgical History     Past Medical History:   Diagnosis Date    Adenomatous colon polyp 09/2017    Heart burn      Past Surgical History:   Procedure Laterality Date    ENDOMETRIAL ABLATION  2010    HYSTERECTOMY (CERVIX STATUS UNKNOWN)  9/28/2012    robotic    OVARY REMOVAL Bilateral 09/28/2012    TUBAL LIGATION  1990        Social History     Denies any cigarette smoking, excessive alcohol drinking or illicit drug use.       Family History     Family History   Problem Relation Age of Onset    Heart Disease Mother     Stroke Mother     Heart Attack

## 2025-07-10 NOTE — PROGRESS NOTES
Pt here for ER follow up appt: CP with R arm radiation.    Has c/o today: SOB with exertion    Denies:CP, dizziness, edema, palpitations    Last EKG Done:  07/08/25.    Allergies, Medication list and pharmacy verified.

## 2025-08-18 ENCOUNTER — RESULTS FOLLOW-UP (OUTPATIENT)
Dept: CARDIOLOGY CLINIC | Age: 60
End: 2025-08-18

## 2025-08-18 ENCOUNTER — HOSPITAL ENCOUNTER (OUTPATIENT)
Age: 60
Discharge: HOME | End: 2025-08-20
Attending: INTERNAL MEDICINE
Payer: COMMERCIAL

## 2025-08-18 VITALS
SYSTOLIC BLOOD PRESSURE: 132 MMHG | DIASTOLIC BLOOD PRESSURE: 70 MMHG | WEIGHT: 203 LBS | BODY MASS INDEX: 34.66 KG/M2 | HEIGHT: 64 IN

## 2025-08-18 DIAGNOSIS — R07.89 OTHER CHEST PAIN: ICD-10-CM

## 2025-08-18 LAB
ECHO AO ASC DIAM: 2.8 CM
ECHO AO ASCENDING AORTA INDEX: 1.42 CM/M2
ECHO AR MAX VEL PISA: 3.9 M/S
ECHO AV CUSP MM: 1.7 CM
ECHO AV PEAK GRADIENT: 11 MMHG
ECHO AV PEAK VELOCITY: 1.7 M/S
ECHO AV REGURGITANT PHT: 596 MS
ECHO AV VELOCITY RATIO: 0.59
ECHO BSA: 2.04 M2
ECHO EST RA PRESSURE: 3 MMHG
ECHO LA AREA 2C: 14.9 CM2
ECHO LA AREA 4C: 15.9 CM2
ECHO LA DIAMETER INDEX: 1.73 CM/M2
ECHO LA DIAMETER: 3.4 CM
ECHO LA MAJOR AXIS: 5.6 CM
ECHO LA MINOR AXIS: 5 CM
ECHO LA VOL BP: 37 ML (ref 22–52)
ECHO LA VOL MOD A2C: 36 ML (ref 22–52)
ECHO LA VOL MOD A4C: 35 ML (ref 22–52)
ECHO LA VOL/BSA BIPLANE: 19 ML/M2 (ref 16–34)
ECHO LA VOLUME INDEX MOD A2C: 18 ML/M2 (ref 16–34)
ECHO LA VOLUME INDEX MOD A4C: 18 ML/M2 (ref 16–34)
ECHO LV E' LATERAL VELOCITY: 11.3 CM/S
ECHO LV E' SEPTAL VELOCITY: 10.9 CM/S
ECHO LV EF PHYSICIAN: 55 %
ECHO LV FRACTIONAL SHORTENING: 30 % (ref 28–44)
ECHO LV INTERNAL DIMENSION DIASTOLE INDEX: 2.23 CM/M2
ECHO LV INTERNAL DIMENSION DIASTOLIC: 4.4 CM (ref 3.9–5.3)
ECHO LV INTERNAL DIMENSION SYSTOLIC INDEX: 1.57 CM/M2
ECHO LV INTERNAL DIMENSION SYSTOLIC: 3.1 CM
ECHO LV ISOVOLUMETRIC RELAXATION TIME (IVRT): 60 MS
ECHO LV IVSD: 0.9 CM (ref 0.6–0.9)
ECHO LV MASS 2D: 118.6 G (ref 67–162)
ECHO LV MASS INDEX 2D: 60.2 G/M2 (ref 43–95)
ECHO LV POSTERIOR WALL DIASTOLIC: 0.8 CM (ref 0.6–0.9)
ECHO LV RELATIVE WALL THICKNESS RATIO: 0.36
ECHO LVOT PEAK GRADIENT: 4 MMHG
ECHO LVOT PEAK VELOCITY: 1 M/S
ECHO MV A VELOCITY: 0.85 M/S
ECHO MV E DECELERATION TIME (DT): 197 MS
ECHO MV E VELOCITY: 0.96 M/S
ECHO MV E/A RATIO: 1.13
ECHO MV E/E' LATERAL: 8.5
ECHO MV E/E' RATIO (AVERAGED): 8.65
ECHO MV E/E' SEPTAL: 8.81
ECHO MV REGURGITANT ALIASING (NYQUIST) VELOCITY: 39 CM/S
ECHO MV REGURGITANT PEAK GRADIENT: 96 MMHG
ECHO MV REGURGITANT PEAK VELOCITY: 4.9 M/S
ECHO MV REGURGITANT RADIUS PISA: 0.5 CM
ECHO MV REGURGITANT VTIA: 184 CM
ECHO PULMONARY ARTERY SYSTOLIC PRESSURE (PASP): 19 MMHG
ECHO PV MAX VELOCITY: 0.7 M/S
ECHO PV PEAK GRADIENT: 2 MMHG
ECHO RIGHT VENTRICULAR SYSTOLIC PRESSURE (RVSP): 19 MMHG
ECHO RV INTERNAL DIMENSION: 2.7 CM
ECHO RV TAPSE: 2.2 CM (ref 1.7–?)
ECHO TV E WAVE: 0.7 M/S
ECHO TV REGURGITANT MAX VELOCITY: 1.99 M/S
ECHO TV REGURGITANT PEAK GRADIENT: 16 MMHG

## 2025-08-18 PROCEDURE — 93306 TTE W/DOPPLER COMPLETE: CPT

## 2025-08-18 PROCEDURE — 93306 TTE W/DOPPLER COMPLETE: CPT | Performed by: INTERNAL MEDICINE

## 2025-08-25 ENCOUNTER — OFFICE VISIT (OUTPATIENT)
Dept: CARDIOLOGY CLINIC | Age: 60
End: 2025-08-25
Payer: COMMERCIAL

## 2025-08-25 ENCOUNTER — TELEPHONE (OUTPATIENT)
Dept: CARDIOLOGY CLINIC | Age: 60
End: 2025-08-25

## 2025-08-25 VITALS
BODY MASS INDEX: 34.69 KG/M2 | SYSTOLIC BLOOD PRESSURE: 115 MMHG | HEIGHT: 64 IN | HEART RATE: 75 BPM | WEIGHT: 203.2 LBS | DIASTOLIC BLOOD PRESSURE: 72 MMHG

## 2025-08-25 DIAGNOSIS — R07.89 OTHER CHEST PAIN: ICD-10-CM

## 2025-08-25 DIAGNOSIS — I20.89 OTHER FORMS OF ANGINA PECTORIS: Primary | ICD-10-CM

## 2025-08-25 DIAGNOSIS — E66.9 OBESITY (BMI 30-39.9): ICD-10-CM

## 2025-08-25 DIAGNOSIS — E78.5 DYSLIPIDEMIA: ICD-10-CM

## 2025-08-25 PROCEDURE — 99215 OFFICE O/P EST HI 40 MIN: CPT | Performed by: INTERNAL MEDICINE

## 2025-08-26 ENCOUNTER — TELEPHONE (OUTPATIENT)
Dept: CARDIOLOGY CLINIC | Age: 60
End: 2025-08-26

## 2025-08-29 ENCOUNTER — HOSPITAL ENCOUNTER (OUTPATIENT)
Age: 60
Setting detail: OUTPATIENT SURGERY
Discharge: HOME | End: 2025-08-29
Attending: INTERNAL MEDICINE | Admitting: INTERNAL MEDICINE
Payer: COMMERCIAL

## 2025-08-29 VITALS
BODY MASS INDEX: 34.66 KG/M2 | RESPIRATION RATE: 13 BRPM | WEIGHT: 203 LBS | HEIGHT: 64 IN | HEART RATE: 70 BPM | SYSTOLIC BLOOD PRESSURE: 143 MMHG | TEMPERATURE: 97.8 F | DIASTOLIC BLOOD PRESSURE: 73 MMHG | OXYGEN SATURATION: 97 %

## 2025-08-29 DIAGNOSIS — Z95.5 STATUS POST PRIMARY ANGIOPLASTY WITH CORONARY STENT: Primary | ICD-10-CM

## 2025-08-29 DIAGNOSIS — I20.89 OTHER FORMS OF ANGINA PECTORIS: ICD-10-CM

## 2025-08-29 LAB
ABO GROUP BLD: NORMAL
ACTIVATED CLOTTING TIME: 222 SECONDS (ref 1–150)
ACTIVATED CLOTTING TIME: 239 SECONDS (ref 1–150)
ANION GAP SERPL CALC-SCNC: 9 MEQ/L (ref 8–16)
APTT PPP: 28.1 SECONDS (ref 22–38)
BUN SERPL-MCNC: 18 MG/DL (ref 8–23)
CALCIUM SERPL-MCNC: 9.3 MG/DL (ref 8.5–10.5)
CHLORIDE SERPL-SCNC: 107 MEQ/L (ref 98–111)
CHOLEST SERPL-MCNC: 216 MG/DL (ref 100–199)
CO2 SERPL-SCNC: 23 MEQ/L (ref 22–29)
CREAT SERPL-MCNC: 0.7 MG/DL (ref 0.5–0.9)
DEPRECATED RDW RBC AUTO: 42.5 FL (ref 35–45)
ECHO BSA: 2.04 M2
EKG ATRIAL RATE: 61 BPM
EKG ATRIAL RATE: 67 BPM
EKG P AXIS: 51 DEGREES
EKG P AXIS: 54 DEGREES
EKG P-R INTERVAL: 164 MS
EKG P-R INTERVAL: 166 MS
EKG Q-T INTERVAL: 400 MS
EKG Q-T INTERVAL: 412 MS
EKG QRS DURATION: 80 MS
EKG QRS DURATION: 82 MS
EKG QTC CALCULATION (BAZETT): 414 MS
EKG QTC CALCULATION (BAZETT): 422 MS
EKG R AXIS: -7 DEGREES
EKG R AXIS: 14 DEGREES
EKG T AXIS: 13 DEGREES
EKG T AXIS: 38 DEGREES
EKG VENTRICULAR RATE: 61 BPM
EKG VENTRICULAR RATE: 67 BPM
ERYTHROCYTE [DISTWIDTH] IN BLOOD BY AUTOMATED COUNT: 12.2 % (ref 11.5–14.5)
GFR SERPL CREATININE-BSD FRML MDRD: > 90 ML/MIN/1.73M2
GLUCOSE SERPL-MCNC: 97 MG/DL (ref 74–109)
HCT VFR BLD AUTO: 41.6 % (ref 37–47)
HDLC SERPL-MCNC: 61 MG/DL
HGB BLD-MCNC: 14 GM/DL (ref 12–16)
IAT IGG-SP REAG SERPL QL: NORMAL
INR PPP: 0.97 (ref 0.85–1.13)
LDLC SERPL CALC-MCNC: 135 MG/DL
MCH RBC QN AUTO: 32 PG (ref 26–33)
MCHC RBC AUTO-ENTMCNC: 33.7 GM/DL (ref 32.2–35.5)
MCV RBC AUTO: 95 FL (ref 81–99)
PLATELET # BLD AUTO: 261 THOU/MM3 (ref 130–400)
PMV BLD AUTO: 9 FL (ref 9.4–12.4)
POTASSIUM SERPL-SCNC: 4.2 MEQ/L (ref 3.5–5.2)
PROTHROMBIN TIME: 11.3 SECONDS (ref 10–13.5)
RBC # BLD AUTO: 4.38 MILL/MM3 (ref 4.2–5.4)
RH BLD: NORMAL
SODIUM SERPL-SCNC: 139 MEQ/L (ref 135–145)
TRIGL SERPL-MCNC: 100 MG/DL (ref 0–199)
WBC # BLD AUTO: 6.4 THOU/MM3 (ref 4.8–10.8)

## 2025-08-29 PROCEDURE — C9600 PERC DRUG-EL COR STENT SING: HCPCS | Performed by: INTERNAL MEDICINE

## 2025-08-29 PROCEDURE — 7100000010 HC PHASE II RECOVERY - FIRST 15 MIN: Performed by: INTERNAL MEDICINE

## 2025-08-29 PROCEDURE — C1894 INTRO/SHEATH, NON-LASER: HCPCS | Performed by: INTERNAL MEDICINE

## 2025-08-29 PROCEDURE — 85347 COAGULATION TIME ACTIVATED: CPT

## 2025-08-29 PROCEDURE — C1769 GUIDE WIRE: HCPCS | Performed by: INTERNAL MEDICINE

## 2025-08-29 PROCEDURE — 99153 MOD SED SAME PHYS/QHP EA: CPT | Performed by: INTERNAL MEDICINE

## 2025-08-29 PROCEDURE — 93005 ELECTROCARDIOGRAM TRACING: CPT | Performed by: PHYSICIAN ASSISTANT

## 2025-08-29 PROCEDURE — 80048 BASIC METABOLIC PNL TOTAL CA: CPT

## 2025-08-29 PROCEDURE — 2580000003 HC RX 258: Performed by: PHYSICIAN ASSISTANT

## 2025-08-29 PROCEDURE — 93458 L HRT ARTERY/VENTRICLE ANGIO: CPT | Performed by: INTERNAL MEDICINE

## 2025-08-29 PROCEDURE — 86885 COOMBS TEST INDIRECT QUAL: CPT

## 2025-08-29 PROCEDURE — 92928 PRQ TCAT PLMT NTRAC ST 1 LES: CPT | Performed by: INTERNAL MEDICINE

## 2025-08-29 PROCEDURE — 36415 COLL VENOUS BLD VENIPUNCTURE: CPT

## 2025-08-29 PROCEDURE — 86901 BLOOD TYPING SEROLOGIC RH(D): CPT

## 2025-08-29 PROCEDURE — 99152 MOD SED SAME PHYS/QHP 5/>YRS: CPT | Performed by: INTERNAL MEDICINE

## 2025-08-29 PROCEDURE — 7100000011 HC PHASE II RECOVERY - ADDTL 15 MIN: Performed by: INTERNAL MEDICINE

## 2025-08-29 PROCEDURE — 6360000004 HC RX CONTRAST MEDICATION: Performed by: INTERNAL MEDICINE

## 2025-08-29 PROCEDURE — 2709999900 HC NON-CHARGEABLE SUPPLY: Performed by: INTERNAL MEDICINE

## 2025-08-29 PROCEDURE — 85610 PROTHROMBIN TIME: CPT

## 2025-08-29 PROCEDURE — C1887 CATHETER, GUIDING: HCPCS | Performed by: INTERNAL MEDICINE

## 2025-08-29 PROCEDURE — 93005 ELECTROCARDIOGRAM TRACING: CPT | Performed by: INTERNAL MEDICINE

## 2025-08-29 PROCEDURE — 80061 LIPID PANEL: CPT

## 2025-08-29 PROCEDURE — 85730 THROMBOPLASTIN TIME PARTIAL: CPT

## 2025-08-29 PROCEDURE — 6360000002 HC RX W HCPCS: Performed by: INTERNAL MEDICINE

## 2025-08-29 PROCEDURE — 93010 ELECTROCARDIOGRAM REPORT: CPT | Performed by: INTERNAL MEDICINE

## 2025-08-29 PROCEDURE — C1874 STENT, COATED/COV W/DEL SYS: HCPCS | Performed by: INTERNAL MEDICINE

## 2025-08-29 PROCEDURE — 85027 COMPLETE CBC AUTOMATED: CPT

## 2025-08-29 PROCEDURE — 86900 BLOOD TYPING SEROLOGIC ABO: CPT

## 2025-08-29 PROCEDURE — 6370000000 HC RX 637 (ALT 250 FOR IP): Performed by: INTERNAL MEDICINE

## 2025-08-29 DEVICE — STENT CORONARY ONYX FRONTIER RX 2.5X12 MM ZOTAROLIMUS ELUT: Type: IMPLANTABLE DEVICE | Status: FUNCTIONAL

## 2025-08-29 RX ORDER — SODIUM CHLORIDE 9 MG/ML
INJECTION, SOLUTION INTRAVENOUS CONTINUOUS
Status: DISCONTINUED | OUTPATIENT
Start: 2025-08-29 | End: 2025-08-29 | Stop reason: HOSPADM

## 2025-08-29 RX ORDER — SODIUM CHLORIDE 9 MG/ML
INJECTION, SOLUTION INTRAVENOUS PRN
Status: DISCONTINUED | OUTPATIENT
Start: 2025-08-29 | End: 2025-08-29 | Stop reason: HOSPADM

## 2025-08-29 RX ORDER — IOPAMIDOL 755 MG/ML
INJECTION, SOLUTION INTRAVASCULAR PRN
Status: DISCONTINUED | OUTPATIENT
Start: 2025-08-29 | End: 2025-08-29 | Stop reason: HOSPADM

## 2025-08-29 RX ORDER — NICARDIPINE HYDROCHLORIDE 2.5 MG/ML
INJECTION INTRAVENOUS PRN
Status: DISCONTINUED | OUTPATIENT
Start: 2025-08-29 | End: 2025-08-29 | Stop reason: HOSPADM

## 2025-08-29 RX ORDER — LIDOCAINE HYDROCHLORIDE 20 MG/ML
INJECTION, SOLUTION EPIDURAL; INFILTRATION; INTRACAUDAL; PERINEURAL PRN
Status: DISCONTINUED | OUTPATIENT
Start: 2025-08-29 | End: 2025-08-29 | Stop reason: HOSPADM

## 2025-08-29 RX ORDER — NITROGLYCERIN 0.4 MG/1
0.4 TABLET SUBLINGUAL EVERY 5 MIN PRN
Status: DISCONTINUED | OUTPATIENT
Start: 2025-08-29 | End: 2025-08-29 | Stop reason: HOSPADM

## 2025-08-29 RX ORDER — SODIUM CHLORIDE 0.9 % (FLUSH) 0.9 %
5-40 SYRINGE (ML) INJECTION EVERY 12 HOURS SCHEDULED
Status: DISCONTINUED | OUTPATIENT
Start: 2025-08-29 | End: 2025-08-29 | Stop reason: HOSPADM

## 2025-08-29 RX ORDER — SODIUM CHLORIDE 0.9 % (FLUSH) 0.9 %
5-40 SYRINGE (ML) INJECTION PRN
Status: DISCONTINUED | OUTPATIENT
Start: 2025-08-29 | End: 2025-08-29 | Stop reason: HOSPADM

## 2025-08-29 RX ORDER — ASPIRIN 325 MG
325 TABLET ORAL ONCE
Status: DISCONTINUED | OUTPATIENT
Start: 2025-08-29 | End: 2025-08-29 | Stop reason: HOSPADM

## 2025-08-29 RX ORDER — ROSUVASTATIN CALCIUM 40 MG/1
40 TABLET, COATED ORAL DAILY
Qty: 90 TABLET | Refills: 3 | Status: SHIPPED | OUTPATIENT
Start: 2025-08-29

## 2025-08-29 RX ORDER — FENTANYL CITRATE 50 UG/ML
INJECTION, SOLUTION INTRAMUSCULAR; INTRAVENOUS PRN
Status: DISCONTINUED | OUTPATIENT
Start: 2025-08-29 | End: 2025-08-29 | Stop reason: HOSPADM

## 2025-08-29 RX ORDER — ASPIRIN 81 MG/1
81 TABLET ORAL DAILY
Qty: 90 TABLET | Refills: 3 | Status: SHIPPED | OUTPATIENT
Start: 2025-08-29

## 2025-08-29 RX ORDER — TICAGRELOR 90 MG/1
TABLET, FILM COATED ORAL PRN
Status: DISCONTINUED | OUTPATIENT
Start: 2025-08-29 | End: 2025-08-29 | Stop reason: HOSPADM

## 2025-08-29 RX ORDER — TICAGRELOR 90 MG/1
90 TABLET, FILM COATED ORAL 2 TIMES DAILY
Qty: 180 TABLET | Refills: 3 | Status: SHIPPED | OUTPATIENT
Start: 2025-08-29

## 2025-08-29 RX ORDER — ACETAMINOPHEN 325 MG/1
650 TABLET ORAL EVERY 4 HOURS PRN
Status: DISCONTINUED | OUTPATIENT
Start: 2025-08-29 | End: 2025-08-29 | Stop reason: HOSPADM

## 2025-08-29 RX ORDER — METOPROLOL SUCCINATE 25 MG/1
25 TABLET, EXTENDED RELEASE ORAL DAILY
Qty: 90 TABLET | Refills: 3 | Status: SHIPPED | OUTPATIENT
Start: 2025-08-29

## 2025-08-29 RX ORDER — HEPARIN SODIUM 1000 [USP'U]/ML
INJECTION, SOLUTION INTRAVENOUS; SUBCUTANEOUS PRN
Status: DISCONTINUED | OUTPATIENT
Start: 2025-08-29 | End: 2025-08-29 | Stop reason: HOSPADM

## 2025-08-29 RX ORDER — MIDAZOLAM HYDROCHLORIDE 1 MG/ML
INJECTION, SOLUTION INTRAMUSCULAR; INTRAVENOUS PRN
Status: DISCONTINUED | OUTPATIENT
Start: 2025-08-29 | End: 2025-08-29 | Stop reason: HOSPADM

## 2025-08-29 RX ADMIN — SODIUM CHLORIDE: 0.9 INJECTION, SOLUTION INTRAVENOUS at 08:24

## 2025-08-29 ASSESSMENT — PAIN SCALES - GENERAL
PAINLEVEL_OUTOF10: 0

## 2025-09-02 ENCOUNTER — TELEPHONE (OUTPATIENT)
Dept: CARDIOLOGY CLINIC | Age: 60
End: 2025-09-02

## (undated) DEVICE — CATHETER DIAG 5FR L100CM LUMN ID0.047IN JR4 CRV 0 SIDE H

## (undated) DEVICE — KIT ANGIO W/ AT P65 PREM HND CTRL FOR CNTRST DEL ANGIOTOUCH

## (undated) DEVICE — KIT MFLD ISOLATN NACL CNTRST PRT TBNG SPIK W/ PRSS TRNSDUC

## (undated) DEVICE — DRAPE KIT RAMAPR RADIATION SHIELD

## (undated) DEVICE — DRAPE SURG NEO W43.5XL60IN ABSRB REINF W18XL20IN FEN

## (undated) DEVICE — GUIDEWIRE VASC L180CM DIA0.014IN COR STR SHARPEABLE TIP

## (undated) DEVICE — VALVE HEMSTAS W/ GWIRE INSRTN TOOL GRDIAN II NC

## (undated) DEVICE — SHEATH INTRO 6FR L10CM DIL 0.021IN PLAS SHT ANG GWIRE L45CM

## (undated) DEVICE — DEVICE INFL 20ML 30ATM POLYCARB BRL ABS PLUNG DGT DISPLAY

## (undated) DEVICE — CATHETER GUID 6FR L100CM DIA0.071IN NYL SHFT JL3.5 W/O SIDE

## (undated) DEVICE — BAND COMPR L24CM REG CLR PLAS HEMSTAT EXT HK AND LOOP RETEN

## (undated) DEVICE — Device

## (undated) DEVICE — GUIDEWIRE VASC L260CM DIA0.035IN TAPR L4.5CM FLPY TIP

## (undated) DEVICE — CATHETER DIAG 5FR L100CM LUMN ID0.047IN JL3.5 CRV 0 SIDE H